# Patient Record
Sex: FEMALE | Race: WHITE
[De-identification: names, ages, dates, MRNs, and addresses within clinical notes are randomized per-mention and may not be internally consistent; named-entity substitution may affect disease eponyms.]

---

## 2017-04-13 ENCOUNTER — HOSPITAL ENCOUNTER (INPATIENT)
Dept: HOSPITAL 17 - NEPE | Age: 57
LOS: 1 days | Discharge: HOME | DRG: 378 | End: 2017-04-14
Attending: HOSPITALIST | Admitting: HOSPITALIST
Payer: MEDICAID

## 2017-04-13 VITALS
TEMPERATURE: 98.4 F | DIASTOLIC BLOOD PRESSURE: 81 MMHG | RESPIRATION RATE: 16 BRPM | SYSTOLIC BLOOD PRESSURE: 165 MMHG | OXYGEN SATURATION: 98 % | HEART RATE: 99 BPM

## 2017-04-13 VITALS
HEART RATE: 99 BPM | DIASTOLIC BLOOD PRESSURE: 109 MMHG | RESPIRATION RATE: 16 BRPM | SYSTOLIC BLOOD PRESSURE: 185 MMHG | OXYGEN SATURATION: 98 %

## 2017-04-13 VITALS — OXYGEN SATURATION: 97 % | HEART RATE: 94 BPM | RESPIRATION RATE: 14 BRPM

## 2017-04-13 VITALS — BODY MASS INDEX: 24.02 KG/M2 | WEIGHT: 144.18 LBS | HEIGHT: 65 IN

## 2017-04-13 VITALS — TEMPERATURE: 97.9 F | DIASTOLIC BLOOD PRESSURE: 97 MMHG | SYSTOLIC BLOOD PRESSURE: 178 MMHG

## 2017-04-13 VITALS — SYSTOLIC BLOOD PRESSURE: 175 MMHG | DIASTOLIC BLOOD PRESSURE: 90 MMHG

## 2017-04-13 VITALS
DIASTOLIC BLOOD PRESSURE: 74 MMHG | HEART RATE: 99 BPM | TEMPERATURE: 98.4 F | SYSTOLIC BLOOD PRESSURE: 139 MMHG | RESPIRATION RATE: 20 BRPM | OXYGEN SATURATION: 98 %

## 2017-04-13 VITALS
SYSTOLIC BLOOD PRESSURE: 175 MMHG | RESPIRATION RATE: 14 BRPM | HEART RATE: 99 BPM | DIASTOLIC BLOOD PRESSURE: 105 MMHG | OXYGEN SATURATION: 99 %

## 2017-04-13 VITALS
TEMPERATURE: 98.1 F | OXYGEN SATURATION: 97 % | DIASTOLIC BLOOD PRESSURE: 63 MMHG | HEART RATE: 88 BPM | SYSTOLIC BLOOD PRESSURE: 158 MMHG | RESPIRATION RATE: 16 BRPM

## 2017-04-13 VITALS
HEART RATE: 98 BPM | OXYGEN SATURATION: 96 % | DIASTOLIC BLOOD PRESSURE: 103 MMHG | RESPIRATION RATE: 18 BRPM | SYSTOLIC BLOOD PRESSURE: 199 MMHG

## 2017-04-13 VITALS — OXYGEN SATURATION: 99 %

## 2017-04-13 DIAGNOSIS — Z88.1: ICD-10-CM

## 2017-04-13 DIAGNOSIS — Z72.0: ICD-10-CM

## 2017-04-13 DIAGNOSIS — M19.90: ICD-10-CM

## 2017-04-13 DIAGNOSIS — J44.9: ICD-10-CM

## 2017-04-13 DIAGNOSIS — F41.9: ICD-10-CM

## 2017-04-13 DIAGNOSIS — K92.0: Primary | ICD-10-CM

## 2017-04-13 DIAGNOSIS — D72.829: ICD-10-CM

## 2017-04-13 DIAGNOSIS — I25.10: ICD-10-CM

## 2017-04-13 DIAGNOSIS — E87.1: ICD-10-CM

## 2017-04-13 DIAGNOSIS — R73.01: ICD-10-CM

## 2017-04-13 DIAGNOSIS — Z88.8: ICD-10-CM

## 2017-04-13 DIAGNOSIS — Z88.5: ICD-10-CM

## 2017-04-13 DIAGNOSIS — B18.2: ICD-10-CM

## 2017-04-13 DIAGNOSIS — K21.9: ICD-10-CM

## 2017-04-13 DIAGNOSIS — F10.10: ICD-10-CM

## 2017-04-13 DIAGNOSIS — I10: ICD-10-CM

## 2017-04-13 DIAGNOSIS — Z88.0: ICD-10-CM

## 2017-04-13 DIAGNOSIS — F31.9: ICD-10-CM

## 2017-04-13 LAB
ALP SERPL-CCNC: 94 U/L (ref 45–117)
ALT SERPL-CCNC: 36 U/L (ref 10–53)
ANION GAP SERPL CALC-SCNC: 13 MEQ/L (ref 5–15)
AST SERPL-CCNC: 58 U/L (ref 15–37)
BASOPHILS # BLD AUTO: 0 TH/MM3 (ref 0–0.2)
BASOPHILS NFR BLD: 0.1 % (ref 0–2)
BILIRUB SERPL-MCNC: 0.6 MG/DL (ref 0.2–1)
BUN SERPL-MCNC: 12 MG/DL (ref 7–18)
CHLORIDE SERPL-SCNC: 85 MEQ/L (ref 98–107)
EOSINOPHIL # BLD: 0 TH/MM3 (ref 0–0.4)
EOSINOPHIL NFR BLD: 0 % (ref 0–4)
ERYTHROCYTE [DISTWIDTH] IN BLOOD BY AUTOMATED COUNT: 13.2 % (ref 11.6–17.2)
GFR SERPLBLD BASED ON 1.73 SQ M-ARVRAT: 67 ML/MIN (ref 89–?)
HCO3 BLD-SCNC: 30.7 MEQ/L (ref 21–32)
HCT VFR BLD CALC: 37.9 % (ref 35–46)
HCT VFR BLD CALC: 41.2 % (ref 35–46)
HEMO FLAGS: (no result)
INR PPP: 1.2 RATIO
LYMPHOCYTES # BLD AUTO: 0.6 TH/MM3 (ref 1–4.8)
LYMPHOCYTES NFR BLD AUTO: 5.4 % (ref 9–44)
MCH RBC QN AUTO: 32.4 PG (ref 27–34)
MCHC RBC AUTO-ENTMCNC: 35.5 % (ref 32–36)
MCV RBC AUTO: 91.3 FL (ref 80–100)
MONOCYTES NFR BLD: 7.2 % (ref 0–8)
NEUTROPHILS # BLD AUTO: 10.3 TH/MM3 (ref 1.8–7.7)
NEUTROPHILS NFR BLD AUTO: 87.3 % (ref 16–70)
PLATELET # BLD: 372 TH/MM3 (ref 150–450)
POTASSIUM SERPL-SCNC: 4.3 MEQ/L (ref 3.5–5.1)
PROTHROMBIN TIME: 13.2 SEC (ref 9.8–11.6)
RBC # BLD AUTO: 4.51 MIL/MM3 (ref 4–5.3)
REVIEW FLAG: (no result)
SODIUM SERPL-SCNC: 129 MEQ/L (ref 136–145)
WBC # BLD AUTO: 11.8 TH/MM3 (ref 4–11)

## 2017-04-13 PROCEDURE — 85025 COMPLETE CBC W/AUTO DIFF WBC: CPT

## 2017-04-13 PROCEDURE — 96375 TX/PRO/DX INJ NEW DRUG ADDON: CPT

## 2017-04-13 PROCEDURE — 86850 RBC ANTIBODY SCREEN: CPT

## 2017-04-13 PROCEDURE — 86901 BLOOD TYPING SEROLOGIC RH(D): CPT

## 2017-04-13 PROCEDURE — 85018 HEMOGLOBIN: CPT

## 2017-04-13 PROCEDURE — 0DB38ZX EXCISION OF LOWER ESOPHAGUS, VIA NATURAL OR ARTIFICIAL OPENING ENDOSCOPIC, DIAGNOSTIC: ICD-10-PCS | Performed by: INTERNAL MEDICINE

## 2017-04-13 PROCEDURE — 83036 HEMOGLOBIN GLYCOSYLATED A1C: CPT

## 2017-04-13 PROCEDURE — 80307 DRUG TEST PRSMV CHEM ANLYZR: CPT

## 2017-04-13 PROCEDURE — 88305 TISSUE EXAM BY PATHOLOGIST: CPT

## 2017-04-13 PROCEDURE — 88312 SPECIAL STAINS GROUP 1: CPT

## 2017-04-13 PROCEDURE — 85610 PROTHROMBIN TIME: CPT

## 2017-04-13 PROCEDURE — 96366 THER/PROPH/DIAG IV INF ADDON: CPT

## 2017-04-13 PROCEDURE — 80053 COMPREHEN METABOLIC PANEL: CPT

## 2017-04-13 PROCEDURE — 96365 THER/PROPH/DIAG IV INF INIT: CPT

## 2017-04-13 PROCEDURE — C9113 INJ PANTOPRAZOLE SODIUM, VIA: HCPCS

## 2017-04-13 PROCEDURE — 85014 HEMATOCRIT: CPT

## 2017-04-13 PROCEDURE — 76937 US GUIDE VASCULAR ACCESS: CPT

## 2017-04-13 PROCEDURE — 86900 BLOOD TYPING SEROLOGIC ABO: CPT

## 2017-04-13 RX ADMIN — PANTOPRAZOLE SODIUM SCH MLS/HR: 40 INJECTION, POWDER, FOR SOLUTION INTRAVENOUS at 08:15

## 2017-04-13 RX ADMIN — ONDANSETRON PRN MG: 2 INJECTION, SOLUTION INTRAMUSCULAR; INTRAVENOUS at 20:25

## 2017-04-13 RX ADMIN — Medication SCH ML: at 21:00

## 2017-04-13 RX ADMIN — PHENYTOIN SODIUM SCH MLS/HR: 50 INJECTION INTRAMUSCULAR; INTRAVENOUS at 12:00

## 2017-04-13 NOTE — GIPROC
[facility]

[address] [facility]. [facility], [address]

 

 

EGD PROCEDURE REPORT     EXAM DATE: 04/13/2017

 

PATIENT NAME:      Amelia Gill           MR #:      [id number]

YOB: 1960      VISIT #:     [id number]

ATTENDING:     Shen Beckford MD     ORDER #:     [id number]

ASSISTANT:      Shiva Gil Wilcox-Hassen, Alice, and Alice Garcia

STATUS:     inpatient

 

INDICATIONS:  The patient is a 56 yr old female here for an EGD due to

hematemesis

PROCEDURE PERFORMED:     EGD w/ biopsy

MEDICATIONS:     Per Anesthesia and None.

TOPICAL ANESTHETIC:

 

CONSENT: The patient understands the risks and benefits of the procedure and

understands that these risks include, but are not limited to: sedation,

allergic reaction, infection, perforation and/or bleeding. Alternative means of

evaluation and treatment include, among others: physical exam, x-rays, and/or

surgical intervention. The patient elects to proceed with this endoscopic

procedure.

 



medical equipment was checked for proper function. Hand hygiene and appropriate

measures for infection prevention was taken. After the risks, benefits and

alternatives of the procedure were thoroughly explained, Informed consent was

verified, confirmed and timeout was successfully executed by the treatment

team. The patient was anesthetized with topical anesthesia and the Pentax

[id number] endoscope was introduced through the mouth and advanced to the second

portion of the duodenum.  Retroflexed views revealed old blood  The gastroscope

was then slowly withdrawn and removed.

Gastropathy in the body.   Esophagitis Bx from distal esophagus.

 

 

 

ADVERSE EVENTS:     There were no complications.

IMPRESSIONS:     1.  Gastropathy in the body

2.  Esophagitis Bx from distal esophagus

3.  Retroflexed views revealed old blood

 

RECOMMENDATIONS:     1.  Await biopsy results.  Biopsy results will not be ready

for 7-10 days.  If you don't hear from us in two weeks, call our office for

biopsy results.

2.  Anti-reflux regimen

3.  Continue PPI

4.  Avoid NSAIDS

5.  No ETOH

PATIENT CONDITION:     stable

DISPOSITION:     Inpatient

REPEAT EXAM:     Return as needed for EGD

 

 

___________________________________

Shen Beckford MD

eSigned:  Shen Beckford MD 04/13/2017 2:52 PM

 

 

cc:

## 2017-04-13 NOTE — PD
HPI


Chief Complaint:  Abdominal Pain


Time Seen by Provider:  07:40


Travel History


International Travel<30 days:  No


Contact w/Intl Traveler<30days:  No


Traveled to known affect area:  No





History of Present Illness


HPI


56-year-old female came to the emergency room with history of vomiting and 

abdominal pain since yesterday.  She was brought by EMS.  She has vomited 

multiple times and is holding a vomiting bag that is filled with bloody to 

coffee-ground emesis.  Patient says that she goes on binge drinking every 

couple months.  She's been drinking a lot for past couple days.  She points her 

pain to the epigastric area.  Vital signs were stable.





[facility]


Past Medical History


*** Narrative Medical


List of her past medical, surgical, social or family history was reviewed from 

the nursing note.


Anemia:  Yes


Arthritis:  Yes


Asthma:  No


Autoimmune Disease:  No


Blood Disorders:  No


Bipolar Disorder:  Yes


Anxiety:  Yes


Depression:  Yes


Heart Rhythm Problems:  No


Cancer:  No


Cardiovascular Problems:  Yes


High Cholesterol:  No


Chest Pain:  No


Congestive Heart Failure:  No


COPD:  Yes


Cerebrovascular Accident:  No


Diabetes:  No


Diminished Hearing:  No


Endocrine:  No


GERD:  Yes


Glaucoma:  No


Genitourinary:  No


Headaches:  Yes


Hepatitis:  Yes


Hiatal Hernia:  No


Hypertension:  Yes


Kidney Stones:  No


Musculoskeletal:  No


Neurologic:  No


Psychiatric:  Yes (MANIC DEPRESSIVE DISORDER, BIPOLAR)


Reproductive:  No


Respiratory:  Yes (COPD)


Immunizations Current:  No


Myocardial Infarction:  No


Pancreatitis:  Yes


Pneumonia:  Yes


Renal Failure:  No


Seizures:  Yes


Sickle Cell Disease:  No


Sleep Apnea:  No


Thyroid Disease:  No


Ulcer:  No


Tetanus Vaccination:  < 5 Years


Influenza Vaccination:  No


Pregnant?:  Not Pregnant


Menopausal:  Yes





Past Surgical History


Abdominal Surgery:  Yes


AICD:  No


Arteriovenous Shunt:  No


Cardiac Surgery:  No


Ear Surgery:  No


Endocrine Surgery:  No


Eye Surgery:  No


Genitourinary Surgery:  No


Gynecologic Surgery:  Yes (HYSTERECTOMY 2000)


Hysterectomy:  Yes


Insulin Pump:  No


Joint Replacement:  No


Neurologic Surgery:  No


Oral Surgery:  No


Pacemaker:  No


Thoracic Surgery:  No


Tonsillectomy:  Yes


Other Surgery:  Yes (BACK, HAND)





Social History


Alcohol Use:  Yes (ALOT PER PT)


Tobacco Use:  Yes


Substance Use:  No





Allergies-Medications


(Allergen,Severity, Reaction):  


Coded Allergies:  


     Cleocin (Verified  Allergy, Severe, UNKNOWN REACTION, 1/9/16)


     Levaquin (Verified  Allergy, Severe, UNKNOWN REACTION, 1/9/16)


     Penicillin (Verified  Allergy, Severe, BLISTERS, 1/9/16)


     Vistaril (Verified  Allergy, Severe, "HEAD SPINS", 1/9/16)


     Codeine (Verified  Adverse Reaction, Severe, "EYE BLIND", 1/9/16)


 DIPLOPIA AND DIZZINESS


Comments


No known drug allergies.


Reported Meds & Prescriptions





Reported Meds & Active Scripts


Active


Reported


Lisinopril 20 Mg Tab 20 Mg PO DAILY





Narrative Medication


List of her home medications reviewed from the nursing note.





Review of Systems


Except as stated in HPI:  all other systems reviewed are Neg





Physical Exam


Narrative


GENERAL: Awake, alert, anxious


SKIN: Focused skin assessment warm/dry.


HEAD: Atraumatic. Normocephalic. 


EYES: Pupils equal and round. No scleral icterus. No injection or drainage.  

Conjunctival injection.


ENT: No nasal bleeding or discharge.  Mucous membranes pink and moist.


NECK: Trachea midline. No JVD. 


CARDIOVASCULAR: Regular rate and rhythm.  No murmur appreciated.


RESPIRATORY: No accessory muscle use. Clear to auscultation. Breath sounds 

equal bilaterally. 


GASTROINTESTINAL: Abdomen soft, non-tender, nondistended. Hepatic and splenic 

margins not palpable. 


MUSCULOSKELETAL: No obvious deformities. No clubbing.  No cyanosis.  No edema. 


NEUROLOGICAL: Awake and alert. No obvious cranial nerve deficits.  Motor 

grossly within normal limits. Normal speech.


PSYCHIATRIC: Appropriate mood and affect; insight and judgment normal.





Data


Data


Last Documented VS





Orders





 Complete Blood Count With Diff (4/13/17 07:40)


Comprehensive Metabolic Panel (4/13/17 07:40)


Prothrombin Time / Inr (Pt) (4/13/17 07:40)


Alcohol (Ethanol) (4/13/17 07:40)


Type And Screen (4/13/17 07:40)


Ecg Monitoring (4/13/17 07:40)


Iv Access Insert/Monitor (4/13/17 07:40)


Oximetry (4/13/17 07:40)


Ondansetron Inj (Zofran Inj) (4/13/17 07:45)


Sodium Chlor 0.9% 1000 Ml Inj (Ns 1000 M (4/13/17 07:40)


Sodium Chloride 0.9% Flush (Ns Flush) (4/13/17 07:45)


Pantoprazole Inj (Protonix Inj) (4/13/17 07:45)


Pantoprazole Inj (Protonix Inj) (4/13/17 07:45)


NPO (4/13/17 07:51)


Morphine Inj (Morphine Inj) (4/13/17 08:45)


Sodium Chlor 0.9% 1000 Ml Inj (Ns 1000 M (4/13/17 09:00)


Admit Order (Ed Use Only) (4/13/17 10:37)


Consult Gastroenterology (4/13/17 )





Labs








MDM


Medical Decision Making


Medical Screen Exam Complete:  Yes


Emergency Medical Condition:  Yes


Medical Record Reviewed:  Yes


Differential Diagnosis


Alcoholic gastritis, variceal bleed


Narrative Course


8:57 AM blood test results came back and they're within acceptable limits 

except for chemistry and sodium which is low.  Patient is getting second liter 

of IV fluid bolus.  I ordered Protonix bolus and drip.  I spoke with GI Dr. Ferguson and he wanted the patient to be nothing by mouth for upper endoscopy.  

Awaiting for the hospitalist to call back for admission.


Critical Care Narrative


Aggregate critical care time was 30 minutes. Time to perform other separately 

billable procedures was not  


included in the critical care time. My time did not include minutes spent 

treating any other patients simultaneously or on  


activities that did not directly contribute to the patient's treatment.  





The services I provided to this patient were to treat and/or prevent clinically 

significant deterioration that could result  


in: GI bleed, Protonix bolus and drip





I provided critical care services requiring my management, as noted below:


Chart data review, documentation time, medication orders and management, vital 

sign assessments/reviewing monitor data,  


ordering and reviewing lab tests, ordering and interpreting/reviewing x-rays 

and diagnostic studies, care of the patient  


and discussion of the patient with the admitting physicians.





Procedures


EKG Prior to Arrival:  No





HemaPrompt Point of Care


Internal Pos. & Neg. Controls:  Passed


Fecal Specimen Occult Blood:  Positive





Diagnosis





 Primary Impression:  


 GI bleed


 Qualified Code:  K92.2 - Gastrointestinal hemorrhage, unspecified 

gastrointestinal hemorrhage type


 Additional Impression:  


 Alcoholic





Admitting Information


Admitting Physician Requests:  Admit


Scripts


Thiamine (Vitamin B-1)100 Mg [id number] Mg PO DAILY  #30 TAB  Ref 3


   Prov:Robert Gallo MD         4/14/17 


Pantoprazole (Protonix)40 Mg Tab40 Mg PO DAILY  #30 TAB  Ref 3


   Prov:Robert Gallo MD         4/14/17








Fabian Chin MD Apr 13, 2017 07:45


 


Eosinophils # (Auto) 0.0 TH/MM3


 


Basophils # (Auto) 0.0 TH/MM3


 


CBC Comment DIFF FINAL 


 


Differential Comment  


 


Sodium Level 129 MEQ/L


 


Potassium Level 4.3 MEQ/L


 


Chloride Level 85 MEQ/L


 


Carbon Dioxide Level 30.7 MEQ/L


 


Anion Gap 13 MEQ/L


 


Blood Urea Nitrogen 12 MG/DL


 


Creatinine 0.87 MG/DL


 


Estimat Glomerular Filtration 67 ML/MIN





Rate 


 


Random Glucose 168 MG/DL


 


Calcium Level 9.8 MG/DL


 


Total Bilirubin 0.6 MG/DL


 


Aspartate Amino Transf 58 U/L





(AST/SGOT) 


 


Alanine Aminotransferase 36 U/L





(ALT/SGPT) 


 


Alkaline Phosphatase 94 U/L


 


Total Protein 9.2 GM/DL


 


Albumin 4.2 GM/DL


 


Ethyl Alcohol Level LESS THAN 3





 MG/DL


 


Blood Type B NEGATIVE 


 


Antibody Screen NEGATIVE 











MDM


Medical Decision Making


Medical Screen Exam Complete:  Yes


Emergency Medical Condition:  Yes


Medical Record Reviewed:  Yes


Differential Diagnosis


Alcoholic gastritis, variceal bleed


Narrative Course


8:57 AM blood test results came back and they're within acceptable limits 

except for chemistry and sodium which is low.  Patient is getting second liter 

of IV fluid bolus.  I ordered Protonix bolus and drip.  I spoke with GI Dr. Ferguson and he wanted the patient to be nothing by mouth for upper endoscopy.  

Awaiting for the hospitalist to call back for admission.


Critical Care Narrative


Aggregate critical care time was 30 minutes. Time to perform other separately 

billable procedures was not  


included in the critical care time. My time did not include minutes spent 

treating any other patients simultaneously or on  


activities that did not directly contribute to the patient's treatment.  





The services I provided to this patient were to treat and/or prevent clinically 

significant deterioration that could result  


in: GI bleed, Protonix bolus and drip





I provided critical care services requiring my management, as noted below:


Chart data review, documentation time, medication orders and management, vital 

sign assessments/reviewing monitor data,  


ordering and reviewing lab tests, ordering and interpreting/reviewing x-rays 

and diagnostic studies, care of the patient  


and discussion of the patient with the admitting physicians.





Procedures


EKG Prior to Arrival:  No





HemaPrompt Point of Care


Internal Pos. & Neg. Controls:  Passed


Fecal Specimen Occult Blood:  Positive





Diagnosis





 Primary Impression:  


 GI bleed


 Qualified Code:  K92.2 - Gastrointestinal hemorrhage, unspecified 

gastrointestinal hemorrhage type


 Additional Impression:  


 Alcoholic





Admitting Information


Admitting Physician Requests:  Admit








Fabian Chin MD Apr 13, 2017 07:45

## 2017-04-13 NOTE — HHI.HP
__________________________________________________





[facility]


Service


[facility]


Primary Care Physician


Sami Stephens M.D.


Admission Diagnosis


GI bleed


Diagnoses:  


(1) Gastrointestinal hemorrhage with hematemesis


(2) Acute upper GI bleeding


(3) IFG (impaired fasting glucose)


(4) Benign labile hypertension


(5) Hyponatremia


(6) Hepatitis C


(7) ETOH abuse


Chief Complaint:  


Coffee-ground vomiting and Abdominal pain


Travel History


International Travel<30 Days:  No


Contact w/Intl Traveler <30 Da:  No


Traveled to Known Affected Are:  No


History of Present Illness


56 year-old  female with a history of hep C, multiple admission for GI 

bleed was brought to the ED for evaluation of multiple episode of coffee-ground 

emesis since 6 PM yesterday 04/12/17, associated with abdominal pain rated 10/

10 in intensity without any hematuria, hemoptysis or lower GI bleed.  She 

complains of shortness of breath however denies any chest pain.  She states, she

's been drinking heavily.  H/H on admission stable.





Review of Systems


Other 12 systems reviewed and are negative except for the one mentioned in 

history of present illness





Past Family Social History


Past Medical History


Anemia:  Yes


Arthritis:  Yes


Bipolar Disorder:  Yes


Anxiety:  Yes


Depression:  Yes


Cardiovascular Problems:  Yes


COPD:  Yes


GERD:  Yes


Headaches:  Yes


Hepatitis:  Yes


Hypertension:  Yes


Psychiatric:  Yes (MANIC DEPRESSIVE DISORDER, BIPOLAR)


Respiratory:  Yes (COPD)


Pancreatitis:  Yes


Seizures:  Yes














Past Surgical History


Back surgery


Hand surgery


Tonsillectomy


Hysterectomy


Reported Medications


Librium 25 mg Cap (Chlordiazepoxide) 25 Mg Cap 25 Mg PO Q8 


Kcl 20 Meq Tab (Potassium Chloride) 20 Meq Tabcr 20 Meq PO DAILY  10 Days


Magnesium 500 Mg Tab 500 Mg PO DAILY  7 Days


Vitamin B12 (Cyanocobalamin) 1,000 Mcg Tab 1,000 Mcg PO DAILY 


Protonix (Pantoprazole Sodium) 40 Mg Tab 40 Mg PO BID 


Reported


Lisinopril 20 mg (Lisinopril) 20 Mg Tab 0.5 Tab PO DAILY 


Flexeril (Cyclobenzaprine HCl) 10 Mg Tab 10 Mg PO TID 


Seroquel (Quetiapine Fumarate) 100 Mg Tab 200 Mg PO HS


Allergies:  


Coded Allergies:  


     Cleocin (Verified  Allergy, Severe, UNKNOWN REACTION, 1/9/16)


     Levaquin (Verified  Allergy, Severe, UNKNOWN REACTION, 1/9/16)


     Penicillin (Verified  Allergy, Severe, BLISTERS, 1/9/16)


     Vistaril (Verified  Allergy, Severe, "HEAD SPINS", 1/9/16)


     Codeine (Verified  Adverse Reaction, Severe, "EYE BLIND", 1/9/16)


 DIPLOPIA AND DIZZINESS


Social History


Alcohol Use:  Yes (ALOT PER PT)


Tobacco Use:  Yes


Substance Use:  No





Physical Exam


Vital Signs





 Vital Signs








  Date Time  Temp Pulse Resp B/P Pulse Ox O2 Delivery O2 Flow Rate FiO2


 


4/13/17 08:31  99 16 185/109 98 Nasal Cannula 2 


 


4/13/17 07:45  94 14  97 Nasal Cannula 2 


 


4/13/17 07:40   16     


 


4/13/17 07:36 98.4 99 16 165/81 98   








Physical Exam


GENERAL: This is a well-nourished, well-developed patient, in no apparent 

distress.


SKIN: No rashes, ecchymoses or lesions. Cool and dry.


HEAD: Atraumatic. Normocephalic. No temporal or scalp tenderness.


EYES: Pupils equal round and reactive. Extraocular motions intact. No scleral 

icterus. No injection or drainage. 


ENT: Nose without bleeding, purulent drainage or septal hematoma. Throat 

without erythema, tonsillar hypertrophy or exudate. Uvula midline. Airway 

patent.


NECK: Trachea midline. No JVD or lymphadenopathy. Supple, nontender, no 

meningeal signs.


CARDIOVASCULAR: Regular rate and rhythm without murmurs, gallops, or rubs. 


RESPIRATORY: Clear to auscultation. Breath sounds equal bilaterally. No wheezes

, rales, or rhonchi.  


GASTROINTESTINAL: Abdomen soft, non-tender, nondistended. No hepato-splenomegaly

, or palpable masses. No guarding.


MUSCULOSKELETAL: Extremities without clubbing, cyanosis, or edema. No joint 

tenderness, effusion, or edema noted. No calf tenderness. Negative Homans sign 

bilaterally.


NEUROLOGICAL: Awake and alert. Cranial nerves II through XII intact.  Motor and 

sensory grossly within normal limits. Five out of 5 muscle strength in all 

muscle groups.  Normal speech.


Laboratory





Laboratory Tests








Test 4/13/17





 08:00


 


White Blood Count 11.8 


 


Red Blood Count 4.51 


 


Hemoglobin 14.6 


 


Hematocrit 41.2 


 


Mean Corpuscular Volume 91.3 


 


Mean Corpuscular Hemoglobin 32.4 


 


Mean Corpuscular Hemoglobin 35.5 





Concent 


 


Red Cell Distribution Width 13.2 


 


Platelet Count 372 


 


Mean Platelet Volume 9.4 


 


Neutrophils (%) (Auto) 87.3 


 


Lymphocytes (%) (Auto) 5.4 


 


Monocytes (%) (Auto) 7.2 


 


Eosinophils (%) (Auto) 0.0 


 


Basophils (%) (Auto) 0.1 


 


Neutrophils # (Auto) 10.3 


 


Lymphocytes # (Auto) 0.6 


 


Monocytes # (Auto) 0.8 


 


Eosinophils # (Auto) 0.0 


 


Basophils # (Auto) 0.0 


 


CBC Comment DIFF FINAL 


 


Differential Comment  


 


Sodium Level 129 


 


Potassium Level 4.3 


 


Chloride Level 85 


 


Carbon Dioxide Level 30.7 


 


Anion Gap 13 


 


Blood Urea Nitrogen 12 


 


Creatinine 0.87 


 


Estimat Glomerular Filtration 67 





Rate 


 


Random Glucose 168 


 


Calcium Level 9.8 


 


Total Bilirubin 0.6 


 


Aspartate Amino Transf 58 





(AST/SGOT) 


 


Alanine Aminotransferase 36 





(ALT/SGPT) 


 


Alkaline Phosphatase 94 


 


Total Protein 9.2 


 


Albumin 4.2 


 


Ethyl Alcohol Level LESS THAN 3 


 


Blood Type B NEGATIVE 


 


Antibody Screen NEGATIVE 








Result Diagram:  


4/13/17 0800                                                                   

             4/13/17 0800








Assessment and Plan


Problem List:  


(1) Gastrointestinal hemorrhage with hematemesis


ICD Code:  K92.0


Status:  Acute


(2) Acute upper GI bleeding


ICD Code:  K92.2


Status:  Acute


(3) Hyponatremia


ICD Code:  E87.1


Status:  Acute


(4) IFG (impaired fasting glucose)


ICD Code:  R73.01


Status:  Acute


(5) Leukocytosis


ICD Code:  D72.829


Status:  Acute


(6) Benign labile hypertension


ICD Code:  I10


Status:  Acute


Assessment and Plan


56-year-old female with





Gastrointestinal hemorrhage with hematemesis 


Acute upper GI bleeding


-start Protonix drip, serial H&H monitoring and consult gastroenterology for 

evaluation for upper endoscopy.  Consider octreotide.  Keep nothing by mouth/

IVF hydration





Benign labile hypertension


-Give labetalol 20 mg IV 1 now, continue labetalol 10 mg every 4 when necessary





History of hepatitis C: Chronic; secondary to history of alcohol abuse will 

check liver ultrasound and rule out cirrhosis





Alcohol abuse: Alcohol cessation advised, start rally pack, CIWA protocol, 

seizure precautions and watch for DTs





Tobacco abuse: Tobacco counseling provided, start nicotine patch





Hyponatremia: 2/2 beer potomania; start IVF hydration and monitor electrolyte





Leukocytosis: Stress reactive, chest x-ray noted and reviewed by me without any 

cardio pulmonary disease.  Continue to monitor





IFG: No known history of diabetes type 2, check hemoglobin A1c and treat 

accordingly





DVT prophylaxis: Chemical anti-apractic contraindicated, bilateral SCDs


GI prophylaxis: PPI


Code Status


Full code


Discussed Condition With


Patient, ED physician





Physician Certification


2 Midnight Certification Type:  Admission for Inpatient Services


Order for Inpatient Services


The services are ordered in accordance with Medicare regulations or non-

Medicare payer requirements, as applicable.  In the case of services not 

specified as inpatient-only, they are appropriately provided as inpatient 

services in accordance with the 2-midnight benchmark.


Estimated LOS (days):  2


 days is the estimated time the patient will need to remain in the hospital, 

assuming treatment plan goals are met and no additional complications.


Post-Hospital Plan:  Not yet determined








Robert Gallo MD Apr 13, 2017 10:44

## 2017-04-14 VITALS
HEART RATE: 93 BPM | TEMPERATURE: 97.4 F | DIASTOLIC BLOOD PRESSURE: 71 MMHG | RESPIRATION RATE: 20 BRPM | SYSTOLIC BLOOD PRESSURE: 127 MMHG | OXYGEN SATURATION: 93 %

## 2017-04-14 VITALS
RESPIRATION RATE: 20 BRPM | HEART RATE: 97 BPM | SYSTOLIC BLOOD PRESSURE: 151 MMHG | OXYGEN SATURATION: 96 % | TEMPERATURE: 98.6 F | DIASTOLIC BLOOD PRESSURE: 79 MMHG

## 2017-04-14 VITALS
OXYGEN SATURATION: 93 % | DIASTOLIC BLOOD PRESSURE: 75 MMHG | RESPIRATION RATE: 20 BRPM | SYSTOLIC BLOOD PRESSURE: 133 MMHG | HEART RATE: 90 BPM | TEMPERATURE: 99.7 F

## 2017-04-14 LAB
BASOPHILS # BLD AUTO: 0 TH/MM3 (ref 0–0.2)
BASOPHILS NFR BLD: 0.1 % (ref 0–2)
EOSINOPHIL # BLD: 0 TH/MM3 (ref 0–0.4)
EOSINOPHIL NFR BLD: 0.1 % (ref 0–4)
ERYTHROCYTE [DISTWIDTH] IN BLOOD BY AUTOMATED COUNT: 12.9 % (ref 11.6–17.2)
HCT VFR BLD CALC: 32.6 % (ref 35–46)
HEMO FLAGS: (no result)
HEMOGLOBIN A1A: 0.7 %
HEMOGLOBIN A1B: 1.2 %
HEMOGLOBIN AO: 86.8 %
HEMOGLOBIN LA1C: 1.9 %
HEMOGLOBIN P3: 3.5 %
LYMPHOCYTES # BLD AUTO: 1.4 TH/MM3 (ref 1–4.8)
LYMPHOCYTES NFR BLD AUTO: 11.5 % (ref 9–44)
MCH RBC QN AUTO: 31.9 PG (ref 27–34)
MCHC RBC AUTO-ENTMCNC: 34.4 % (ref 32–36)
MCV RBC AUTO: 92.7 FL (ref 80–100)
MONOCYTES NFR BLD: 8.8 % (ref 0–8)
NEUTROPHILS # BLD AUTO: 9.9 TH/MM3 (ref 1.8–7.7)
NEUTROPHILS NFR BLD AUTO: 79.5 % (ref 16–70)
PLATELET # BLD: 173 TH/MM3 (ref 150–450)
RBC # BLD AUTO: 3.51 MIL/MM3 (ref 4–5.3)
WBC # BLD AUTO: 12.4 TH/MM3 (ref 4–11)

## 2017-04-14 RX ADMIN — Medication SCH ML: at 09:00

## 2017-04-14 RX ADMIN — ONDANSETRON PRN MG: 2 INJECTION, SOLUTION INTRAMUSCULAR; INTRAVENOUS at 11:29

## 2017-04-14 RX ADMIN — PHENYTOIN SODIUM SCH MLS/HR: 50 INJECTION INTRAMUSCULAR; INTRAVENOUS at 03:41

## 2017-04-14 RX ADMIN — PANTOPRAZOLE SODIUM SCH MLS/HR: 40 INJECTION, POWDER, FOR SOLUTION INTRAVENOUS at 03:40

## 2017-04-14 NOTE — MB
cc:

ASHISH BARNARD M.D.

****

 

 

DATE OF CONSULTATION

04/13/2017

 

DATE OF BIRTH

1960

 

REFERRING PHYSICIAN

Dr. Altamirano.

 

HISTORY OF PRESENT ILLNESS

A pleasant 56-year-old lady who came because of coffee-ground emesis since

yesterday at 6 p.m. with abdominal pain.  She stated the pain was 10/10.  She

denied hematemesis.  No hematoma, no  hematemesis, no hematochezia and she had

some shortness of breath but currently doing well, without chest pain.  She has

known history of alcohol abuse.  She binge drinks from time to time and she

just has been drinking heavily in the last 48-72 hours.  No other complaint.

 

PAST MEDICAL HISTORY

1. Anxiety, depression.

2. Bipolar disorder.

3. Coronary artery disease.

4. Reflux symptoms.

5. COPD.

6. Headache.

7. Hepatitis C.

8. Hypertension.

9. Manic depressive disorder.

10. History of pancreatitis.

11. History of seizure.

12. Arthritis.

 

MEDICATIONS

Reviewed in the chart.

 

ALLERGIES

LEVAQUIN.

PENICILLIN.

CODEINE.

VISTARIL.

CLEOCIN.

 

SOCIAL HISTORY

Significant for tobacco and alcohol.  No drugs.

 

SURGERIES

1. Hand surgery.

2. Tonsillectomy.

3. Hysterectomy.

4. Back surgery

 

REVIEW OF SYSTEMS

All 12-points negative except HPI.

 

PHYSICAL EXAMINATION

GENERAL:  Alert, oriented, in no acute distress at this time.  VITAL SIGNS:

Stable.

HEENT:  Pupils round, reactive to light.

NECK:  Supple.

CHEST:  Clear to auscultation and precaution at this time.  No wheezing.

ABDOMEN:  Soft, nondistended, nontender, positive bowel sounds.  No

hepatosplenomegaly.  No masses.

MUSCULOSKELETAL:  Normal.

NEUROLOGIC:  Normal.

PSYCHOLOGIC:  Appropriate at this time.

 

LABORATORY DATA

White count 11.8, hemoglobin 14.6, platelets 372.

INR 1.2.

AST 58, ALT 36, total bilirubin 0.6.

BUN 12, creatinine 0.87.

Alcohol level was less than 3.

 

ASSESSMENT AND PLAN

1. A 56-year-old lady with alcohol binge.  She has some coffee-ground emesis

   after drinking heavily.  The patient may have varices, may have

   gastroparesis, esophagitis, peptic ulcer disease.  I am planning on doing

   urgent upper endoscopy.  I discussed with her the procedure and

   complications.  She agreed to have it done.  This will be done today.

2. Elevated liver function tests, AST more than ALT, most likely alcohol

   driven.  I advised the patient to be abstinent of alcohol completely and we

   need to watch for DTs.

3. Further plan to follow based on the finding on the endoscopy and how she is

   doing.

                              _________________________________

                              MD DELLA Chang/MRAGRET       D:  4/13/2017/8:19 PM    T:  4/14/2017/6:15 AM

Visit #:  [id number]      Job #:  [id number]

## 2017-04-14 NOTE — HHI.PR
Subjective


Remarks


Follow up upper GI bleeding/Hematemesis


04/14/17-patient seen and examined; s/p EGD; no Upper GI bleeding over the past 

12 hours





Objective


Vitals





 Vital Signs








  Date Time  Temp Pulse Resp B/P Pulse Ox O2 Delivery O2 Flow Rate FiO2


 


4/14/17 08:00 98.6 97 20 151/79 96   


 


4/14/17 04:00 97.4 93 20 127/71 93   


 


4/14/17 00:00 99.7 90 20 133/75 93   


 


4/13/17 20:32     99   21


 


4/13/17 20:00 98.4 99 20 139/74 98   


 


4/13/17 15:20  92 16 115/68 99 Room Air  


 


4/13/17 15:00 97.4 90 16 118/66 99 Nasal Cannula 2 


 


4/13/17 13:30 98.1 88 16 158/63 97   


 


4/13/17 12:52  90 14 175/90 97   


 


4/13/17 12:46 97.9 92 14 178/97 97   


 


4/13/17 11:18  99 14 175/105 99 Room Air  


 


4/13/17 10:40  98 18 199/103 96 Room Air  








 I/O








 4/13/17 4/13/17 4/13/17 4/14/17 4/14/17 4/14/17





 07:00 15:00 23:00 07:00 15:00 23:00


 


Intake Total  300 ml 0 ml   


 


Output Total  50 ml 0 ml   


 


Balance  250 ml 0 ml   


 


      


 


Intake Oral   0 ml   


 


Other  300 ml 0 ml   


 


Output Urine Total  0 ml 0 ml   


 


Emesis  50 ml    


 


Estimated Blood Loss  0 ml    


 


# Voids   0   


 


# Bowel Movements   0   








Result Diagram:  


4/14/17 0755                                                                   

             4/13/17 0800





Objective Remarks


GENERAL: NAD


SKIN: Warm and dry.


HEAD: Normocephalic.


EYES: No scleral icterus. No injection or drainage. 


NECK: Supple, trachea midline. No JVD or lymphadenopathy.


CARDIOVASCULAR: Regular rate and rhythm without murmurs, gallops, or rubs. 


RESPIRATORY: Breath sounds equal bilaterally. No accessory muscle use.


GASTROINTESTINAL: Abdomen soft, non-tender, nondistended. 


MUSCULOSKELETAL: No cyanosis, or edema. 


BACK: Nontender without obvious deformity. No CVA tenderness.





Procedures


EGD 4/13/17





A/P


Problem List:  


(1) Gastrointestinal hemorrhage with hematemesis


ICD Code:  K92.0


Status:  Resolved


(2) Acute upper GI bleeding


ICD Code:  K92.2


Status:  Resolved


(3) IFG (impaired fasting glucose)


ICD Code:  R73.01


Status:  Acute


(4) Benign labile hypertension


ICD Code:  I10


Status:  Chronic


(5) Hyponatremia


ICD Code:  E87.1


Status:  Acute


(6) Hepatitis C


ICD Code:  B19.20


Status:  Chronic


(7) ETOH abuse


ICD Code:  F10.10


Status:  Chronic


Assessment and Plan


56-year-old female with





Gastrointestinal hemorrhage with hematemesis 


Acute upper GI bleeding


-s/p EGD 4/13/17 pending pathology


-Appreciate input from GI


- will Protonix drip and discharge home on PPI, 


-NO Alcohol, NSAID





Benign labile hypertension-Resolved


- continue labetalol 10 mg every 4 when necessary





History of hepatitis C: Chronic; secondary to history of alcohol abuse will 

check liver ultrasound and rule out cirrhosis





Alcohol abuse: Alcohol cessation advised, on rally pack, CIWA protocol, seizure 

precautions and watch for DTs





Tobacco abuse: Tobacco counseling provided, on nicotine patch





Hyponatremia: 2/2 beer potomania; continue IVF hydration and monitor electrolyte





Leukocytosis: Stress reactive, chest x-ray without any cardio pulmonary 

disease.  Continue to monitor





IFG: No known history of diabetes type 2,  hemoglobin A1c pending and treat 

accordingly





DVT prophylaxis: Chemical anti-apractic contraindicated, bilateral SCDs


GI prophylaxis: PPI





patient's condition improved since admission therefore she will be discharged 

home


Discharge Planning


Discharge patient to home


Condition on discharge: Improved


Healthy Diet as tolerated


Ad Holly activity


Rx written:Protonix 40mg daily


Follow-up with primary care physician in 1 week


GI in 2-3 weeks








Robert Gallo MD Apr 14, 2017 10:06

## 2017-08-19 ENCOUNTER — HOSPITAL ENCOUNTER (EMERGENCY)
Dept: HOSPITAL 17 - NEPD | Age: 57
Discharge: HOME | End: 2017-08-19
Payer: COMMERCIAL

## 2017-08-19 VITALS
SYSTOLIC BLOOD PRESSURE: 179 MMHG | TEMPERATURE: 98 F | RESPIRATION RATE: 16 BRPM | HEART RATE: 80 BPM | OXYGEN SATURATION: 99 % | DIASTOLIC BLOOD PRESSURE: 84 MMHG

## 2017-08-19 VITALS — BODY MASS INDEX: 25.71 KG/M2 | HEIGHT: 65 IN | WEIGHT: 154.32 LBS
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
## 2017-08-19 DIAGNOSIS — T14.8: Primary | ICD-10-CM

## 2017-08-19 DIAGNOSIS — Z72.0: ICD-10-CM

## 2017-08-19 DIAGNOSIS — I10: ICD-10-CM

## 2017-08-19 PROCEDURE — 96372 THER/PROPH/DIAG INJ SC/IM: CPT

## 2017-08-19 PROCEDURE — 99284 EMERGENCY DEPT VISIT MOD MDM: CPT

## 2017-08-19 NOTE — PD
HPI


.


left sided muscle pain


Chief Complaint:  Back/ Neck Pain or Injury


Time Seen by Provider:  08:45


Travel History


International Travel<30 days:  No


Contact w/Intl Traveler<30days:  No


Traveled to known affect area:  No





History of Present Illness


HPI


57-year-old female here with complaints of left-sided muscle pain.  Patient 

tells me that she was sick about a month ago and that she developed some left-

sided muscle pain.  She has seen her primary care provider and was given muscle 

relaxers yesterday, and has only taken 1 dose.  She did take one this morning, 

but tells me that she is still having significant pain on the left paraspinal 

musculature.  She does not want any pain meds because she has a history of 

being on morphine in the past and tells me that she did not want to get 

addicted.  She does have an appointment with the pain management specialist as 

she is in search of injections to her back or getting back on pain meds.  She 

denies any bowel or bladder dysfunction.  She denies any saddle anesthesia.  

She denies any recent injury.  She does admit to cleaning her house a lot and 

this seems to aggravate her pain.





PFSH


Past Medical History


Anemia:  Yes


Arthritis:  Yes


Asthma:  No


Autoimmune Disease:  No


Blood Disorders:  No


Bipolar Disorder:  Yes


Anxiety:  Yes


Depression:  Yes


Heart Rhythm Problems:  No


Cancer:  No


Cardiovascular Problems:  Yes


High Cholesterol:  No


Chest Pain:  No


Congestive Heart Failure:  No


COPD:  Yes


Cerebrovascular Accident:  No


Diabetes:  No


Diminished Hearing:  No


Endocrine:  No


GERD:  Yes


Glaucoma:  No


Genitourinary:  No


Headaches:  Yes


Hepatitis:  Yes


Hiatal Hernia:  No


Hypertension:  Yes


Kidney Stones:  No


Musculoskeletal:  No


Neurologic:  No


Psychiatric:  Yes (MANIC DEPRESSIVE DISORDER, BIPOLAR)


Reproductive:  No


Respiratory:  Yes


Immunizations Current:  No


Myocardial Infarction:  No


Pancreatitis:  Yes


Pneumonia:  Yes


Renal Failure:  No


Seizures:  Yes


Sickle Cell Disease:  No


Sleep Apnea:  No


Thyroid Disease:  No


Ulcer:  No


Pregnant?:  Not Pregnant


Menopausal:  Yes





Past Surgical History


Abdominal Surgery:  Yes


AICD:  No


Arteriovenous Shunt:  No


Cardiac Surgery:  No


Ear Surgery:  No


Endocrine Surgery:  No


Eye Surgery:  No


Genitourinary Surgery:  No


Gynecologic Surgery:  Yes (HYSTERECTOMY 2000)


Hysterectomy:  Yes


Insulin Pump:  No


Joint Replacement:  No


Neurologic Surgery:  No


Oral Surgery:  No


Pacemaker:  No


Thoracic Surgery:  No


Tonsillectomy:  Yes


Other Surgery:  Yes (BACK, HAND)





Social History


Alcohol Use:  Yes (ALOT PER PT)


Tobacco Use:  Yes


Substance Use:  No





Allergies-Medications


(Allergen,Severity, Reaction):  


Coded Allergies:  


     clindamycin (Unverified  Allergy, Severe, UNKNOWN REACTION, 8/15/17)


     hydroxyzine (Unverified  Allergy, Severe, "HEAD SPINS", 8/15/17)


     levofloxacin (Unverified  Allergy, Severe, UNKNOWN REACTION, 8/15/17)


     penicillin G (Unverified  Allergy, Severe, BLISTERS, 8/15/17)


     codeine (Unverified  Adverse Reaction, Severe, "EYE BLIND", 8/15/17)


 DIPLOPIA AND DIZZINESS


Reported Meds & Prescriptions





Reported Meds & Active Scripts


Active


Vitamin B-1 (Thiamine HCl) 100 Mg Tab 100 Mg PO DAILY


Protonix (Pantoprazole Sodium) 40 Mg Tab 40 Mg PO DAILY


Reported


Lisinopril 20 Mg Tab 20 Mg PO DAILY








Review of Systems


General / Constitutional:  No: Fever


Eyes:  No: Visual changes


HENT:  No: Headaches


Cardiovascular:  No: Chest Pain or Discomfort


Respiratory:  No: Shortness of Breath


Gastrointestinal:  No: Abdominal Pain


Genitourinary:  No: Urgency, Dysuria, Nocturia, Hematuria, Decreased Urinary 

Output, Oliguria


Musculoskeletal:  Positive: Myalgias, Pain (back pain)


Skin:  No Rash


Neurologic:  No: Weakness


Psychiatric:  No: Depression


Endocrine:  No: Polydipsia


Hematologic/Lymphatic:  No: Easy Bruising





Physical Exam


Narrative


GENERAL: AAO x 3, no acute distress, Well-nourished, well-developed patient.


SKIN: Warm and dry. No visible rashes or bruising. 


HEAD: Normocephalic and atraumatic.


EYES: No scleral icterus. No injection or drainage. 


ENT: No nasal drainage noted. Mucous membranes pink. Airway patent. 


NECK: Supple, trachea midline. No JVD.


CARDIOVASCULAR: Regular rate and rhythm without murmurs, gallops, or rubs. 


RESPIRATORY: Breath sounds equal bilaterally. No accessory muscle use. No 

rhonchi or rales. 


GASTROINTESTINAL: Abdomen soft, non-tender, nondistended. no rebound or 

guarding 


EXTREMITIES: No cyanosis or edema. 


BACK: Nontender without obvious deformity. tenderness to left t-l paraspinal 

musculature 


NEURO: CN II-12 intact,  strength normal b/l, UE and LE 5/5, no focal 

deficits


PSYCH: AAO x 3, normal affect.





Data


Data


Last Documented VS





Vital Signs








  Date Time  Temp Pulse Resp B/P Pulse Ox O2 Delivery O2 Flow Rate FiO2


 


8/19/17 08:32 98.0 80 16 179/84 99   











MDM


Medical Decision Making


Medical Screen Exam Complete:  Yes


Emergency Medical Condition:  Yes


Medical Record Reviewed:  Yes


Differential Diagnosis


muscle strain, lumbar radiculopathy, less likely sciatica


Narrative Course


57-year-old female here with complaints of left-sided paraspinal muscular 

tenderness.


On examination she appears to have some pulled muscles.


She is already taking Flexeril this morning, therefore will provide her with a 

shot of Toradol.


I recommend that she follow up with the primary care provider and pain 

.


I do not believe imaging is indicated as this all seems to be muscle related.





I discussed this with patient.  She is in agreement with the recommended 

treatment plan.





Patient verbalized understanding of instructions, questions were answered, and 

thanked me for their care. I advised them if their condition worsens, please 

return to the nearest emergency room for further care.





Diagnosis





 Primary Impression:  


 Muscle strain


Patient Instructions:  General Instructions





***Additional Instructions:


Keep your appointment with the pain specialist.





Take your medications as prescribed.


***Med/Other Pt SpecificInfo:  No Change to Meds


Disposition:  01 DISCHARGE HOME


Condition:  Stable








Vinita Reynoso Aug 19, 2017 08:45

## 2017-08-27 ENCOUNTER — HOSPITAL ENCOUNTER (OUTPATIENT)
Dept: HOSPITAL 17 - NEPC | Age: 57
Setting detail: OBSERVATION
LOS: 1 days | Discharge: HOME | End: 2017-08-28
Attending: HOSPITALIST | Admitting: HOSPITALIST
Payer: COMMERCIAL

## 2017-08-27 VITALS
RESPIRATION RATE: 18 BRPM | OXYGEN SATURATION: 98 % | HEART RATE: 90 BPM | SYSTOLIC BLOOD PRESSURE: 130 MMHG | DIASTOLIC BLOOD PRESSURE: 62 MMHG

## 2017-08-27 VITALS
DIASTOLIC BLOOD PRESSURE: 59 MMHG | OXYGEN SATURATION: 96 % | RESPIRATION RATE: 20 BRPM | TEMPERATURE: 98.4 F | HEART RATE: 84 BPM | SYSTOLIC BLOOD PRESSURE: 125 MMHG

## 2017-08-27 VITALS
SYSTOLIC BLOOD PRESSURE: 148 MMHG | TEMPERATURE: 98.2 F | DIASTOLIC BLOOD PRESSURE: 60 MMHG | OXYGEN SATURATION: 96 % | HEART RATE: 80 BPM | RESPIRATION RATE: 18 BRPM

## 2017-08-27 VITALS
DIASTOLIC BLOOD PRESSURE: 68 MMHG | SYSTOLIC BLOOD PRESSURE: 139 MMHG | HEART RATE: 88 BPM | RESPIRATION RATE: 18 BRPM | OXYGEN SATURATION: 98 %

## 2017-08-27 VITALS
DIASTOLIC BLOOD PRESSURE: 79 MMHG | OXYGEN SATURATION: 96 % | HEART RATE: 79 BPM | RESPIRATION RATE: 20 BRPM | TEMPERATURE: 97 F | SYSTOLIC BLOOD PRESSURE: 150 MMHG

## 2017-08-27 VITALS — HEIGHT: 63 IN | WEIGHT: 143.3 LBS | BODY MASS INDEX: 25.39 KG/M2

## 2017-08-27 VITALS
DIASTOLIC BLOOD PRESSURE: 100 MMHG | SYSTOLIC BLOOD PRESSURE: 170 MMHG | TEMPERATURE: 98.3 F | OXYGEN SATURATION: 98 % | HEART RATE: 98 BPM | RESPIRATION RATE: 18 BRPM

## 2017-08-27 DIAGNOSIS — I10: ICD-10-CM

## 2017-08-27 DIAGNOSIS — I48.91: ICD-10-CM

## 2017-08-27 DIAGNOSIS — E87.6: ICD-10-CM

## 2017-08-27 DIAGNOSIS — F10.10: ICD-10-CM

## 2017-08-27 DIAGNOSIS — H54.7: Primary | ICD-10-CM

## 2017-08-27 DIAGNOSIS — E87.1: ICD-10-CM

## 2017-08-27 DIAGNOSIS — K29.20: ICD-10-CM

## 2017-08-27 DIAGNOSIS — E83.42: ICD-10-CM

## 2017-08-27 LAB
ALP SERPL-CCNC: 83 U/L (ref 45–117)
ALT SERPL-CCNC: 35 U/L (ref 10–53)
ANION GAP SERPL CALC-SCNC: 9 MEQ/L (ref 5–15)
AST SERPL-CCNC: 34 U/L (ref 15–37)
BASOPHILS # BLD AUTO: 0 TH/MM3 (ref 0–0.2)
BASOPHILS NFR BLD: 0.2 % (ref 0–2)
BILIRUB SERPL-MCNC: 0.3 MG/DL (ref 0.2–1)
BUN SERPL-MCNC: 14 MG/DL (ref 7–18)
CHLORIDE SERPL-SCNC: 83 MEQ/L (ref 98–107)
COLOR UR: (no result)
COMMENT (UR): (no result)
CULTURE IF INDICATED: (no result)
EOSINOPHIL # BLD: 0 TH/MM3 (ref 0–0.4)
EOSINOPHIL NFR BLD: 0.1 % (ref 0–4)
ERYTHROCYTE [DISTWIDTH] IN BLOOD BY AUTOMATED COUNT: 12.9 % (ref 11.6–17.2)
GFR SERPLBLD BASED ON 1.73 SQ M-ARVRAT: 56 ML/MIN (ref 89–?)
GLUCOSE UR STRIP-MCNC: 70 MG/DL
HCO3 BLD-SCNC: 28.1 MEQ/L (ref 21–32)
HCT VFR BLD CALC: 43.3 % (ref 35–46)
HEMO FLAGS: (no result)
HGB UR QL STRIP: (no result)
KETONES UR STRIP-MCNC: (no result) MG/DL
LYMPHOCYTES # BLD AUTO: 0.6 TH/MM3 (ref 1–4.8)
LYMPHOCYTES NFR BLD AUTO: 7.5 % (ref 9–44)
MCH RBC QN AUTO: 31.2 PG (ref 27–34)
MCHC RBC AUTO-ENTMCNC: 33.7 % (ref 32–36)
MCV RBC AUTO: 92.4 FL (ref 80–100)
MONOCYTES NFR BLD: 6 % (ref 0–8)
MUCOUS THREADS #/AREA URNS LPF: (no result) /LPF
NEUTROPHILS # BLD AUTO: 7 TH/MM3 (ref 1.8–7.7)
NEUTROPHILS NFR BLD AUTO: 86.2 % (ref 16–70)
NITRITE UR QL STRIP: (no result)
PLATELET # BLD: 253 TH/MM3 (ref 150–450)
POTASSIUM SERPL-SCNC: 4 MEQ/L (ref 3.5–5.1)
RBC # BLD AUTO: 4.69 MIL/MM3 (ref 4–5.3)
SODIUM SERPL-SCNC: 120 MEQ/L (ref 136–145)
SP GR UR STRIP: 1.01 (ref 1–1.03)
WBC # BLD AUTO: 8.1 TH/MM3 (ref 4–11)

## 2017-08-27 PROCEDURE — 80307 DRUG TEST PRSMV CHEM ANLYZR: CPT

## 2017-08-27 PROCEDURE — 99285 EMERGENCY DEPT VISIT HI MDM: CPT

## 2017-08-27 PROCEDURE — 96375 TX/PRO/DX INJ NEW DRUG ADDON: CPT

## 2017-08-27 PROCEDURE — 74177 CT ABD & PELVIS W/CONTRAST: CPT

## 2017-08-27 PROCEDURE — 83735 ASSAY OF MAGNESIUM: CPT

## 2017-08-27 PROCEDURE — 96365 THER/PROPH/DIAG IV INF INIT: CPT

## 2017-08-27 PROCEDURE — G0378 HOSPITAL OBSERVATION PER HR: HCPCS

## 2017-08-27 PROCEDURE — 81001 URINALYSIS AUTO W/SCOPE: CPT

## 2017-08-27 PROCEDURE — 96361 HYDRATE IV INFUSION ADD-ON: CPT

## 2017-08-27 PROCEDURE — 85025 COMPLETE CBC W/AUTO DIFF WBC: CPT

## 2017-08-27 PROCEDURE — 83690 ASSAY OF LIPASE: CPT

## 2017-08-27 PROCEDURE — 80053 COMPREHEN METABOLIC PANEL: CPT

## 2017-08-27 RX ADMIN — Medication SCH ML: at 21:00

## 2017-08-27 RX ADMIN — PANTOPRAZOLE SCH MG: 40 TABLET, DELAYED RELEASE ORAL at 11:25

## 2017-08-27 RX ADMIN — CYCLOBENZAPRINE HYDROCHLORIDE PRN MG: 10 TABLET, FILM COATED ORAL at 13:47

## 2017-08-27 RX ADMIN — CYCLOBENZAPRINE HYDROCHLORIDE PRN MG: 10 TABLET, FILM COATED ORAL at 21:08

## 2017-08-27 RX ADMIN — PHENYTOIN SODIUM SCH MLS/HR: 50 INJECTION INTRAMUSCULAR; INTRAVENOUS at 13:50

## 2017-08-27 NOTE — PD
HPI


Chief Complaint:  Abdominal Pain


Time Seen by Provider:  08:11


Travel History


International Travel<30 days:  No


Contact w/Intl Traveler<30days:  No


Traveled to known affect area:  No





History of Present Illness


HPI


This is a 57-year-old female who has a history of alcohol abuse who presents to 

the emergency department with nausea and vomiting that started last evening and 

has persisted until today with moderate severity epigastric pain, constant, 

nonradiating, with no associated fevers, chills or diarrhea.  She says her last 

drink was last night.





PFSH


Past Medical History


Anemia:  Yes


Arthritis:  Yes


Asthma:  No


Autoimmune Disease:  No


Blood Disorders:  No


Bipolar Disorder:  Yes


Anxiety:  Yes


Depression:  Yes


Heart Rhythm Problems:  No


Cancer:  No


Cardiovascular Problems:  Yes


High Cholesterol:  No


Chest Pain:  No


Congestive Heart Failure:  No


COPD:  Yes


Cerebrovascular Accident:  No


Diabetes:  No


Diminished Hearing:  No


Endocrine:  No


GERD:  Yes


Glaucoma:  No


Genitourinary:  No


Headaches:  Yes


Hepatitis:  Yes


Hiatal Hernia:  No


Hypertension:  Yes


Kidney Stones:  No


Musculoskeletal:  No


Neurologic:  No


Psychiatric:  Yes (MANIC DEPRESSIVE DISORDER, BIPOLAR)


Reproductive:  No


Respiratory:  Yes


Immunizations Current:  No


Myocardial Infarction:  No


Pancreatitis:  Yes


Pneumonia:  Yes


Renal Failure:  No


Seizures:  Yes


Sickle Cell Disease:  No


Sleep Apnea:  No


Thyroid Disease:  No


Ulcer:  No


Tetanus Vaccination:  < 5 Years


Influenza Vaccination:  No


Menopausal:  Yes





Past Surgical History


Abdominal Surgery:  Yes


AICD:  No


Arteriovenous Shunt:  No


Cardiac Surgery:  No


Ear Surgery:  No


Endocrine Surgery:  No


Eye Surgery:  No


Genitourinary Surgery:  No


Gynecologic Surgery:  Yes (HYSTERECTOMY 2000)


Hysterectomy:  Yes


Insulin Pump:  No


Joint Replacement:  No


Neurologic Surgery:  No


Oral Surgery:  No


Pacemaker:  No


Thoracic Surgery:  No


Tonsillectomy:  Yes


Other Surgery:  Yes (BACK, HAND)





Social History


Alcohol Use:  Yes (ALOT PER PT)


Tobacco Use:  Yes (1 PPD)


Substance Use:  No





Allergies-Medications


(Allergen,Severity, Reaction):  


Coded Allergies:  


     clindamycin (Unverified  Allergy, Severe, UNKNOWN REACTION, 8/27/17)


     hydroxyzine (Unverified  Allergy, Severe, "HEAD SPINS", 8/27/17)


     levofloxacin (Unverified  Allergy, Severe, UNKNOWN REACTION, 8/27/17)


     penicillin G (Unverified  Allergy, Severe, BLISTERS, 8/27/17)


     codeine (Unverified  Adverse Reaction, Severe, "EYE BLIND", 8/27/17)


 DIPLOPIA AND DIZZINESS


Reported Meds & Prescriptions





Reported Meds & Active Scripts


Active


Protonix (Pantoprazole Sodium) 40 Mg Tab 40 Mg PO DAILY


Reported


Lisinopril 20 Mg Tab 20 Mg PO DAILY








Review of Systems


Except as stated in HPI:  all other systems reviewed are Neg





Physical Exam


Narrative


GENERAL: Disheveled, tremulous


SKIN: Focused skin assessment warm and dry.


HEAD: Atraumatic. Normocephalic. 


EYES: Pupils equal and round.  No injection or drainage. 


ENT:  Moist mucous membranes


NECK: Trachea midline. 


CARDIOVASCULAR: Regular rate and rhythm.  No murmur appreciated.


RESPIRATORY: Clear to auscultation. Breath sounds equal bilaterally. 


GASTROINTESTINAL: Abdomen soft, moderately tender to palpation in the 

epigastrium with no rebound or guarding.


MUSCULOSKELETAL: No obvious deformities. 


NEUROLOGICAL: Awake and alert. No obvious cranial nerve deficits.  Moving all 

extremities.


PSYCHIATRIC: Appropriate mood and affect; insight and judgment normal.





Data


Data


Last Documented VS





Vital Signs








  Date Time  Temp Pulse Resp B/P (MAP) Pulse Ox O2 Delivery O2 Flow Rate FiO2


 


8/27/17 09:31  90 18 130/62 (84) 98 Room Air  


 


8/27/17 08:01 98.3       








Orders





 Orders


Complete Blood Count With Diff (8/27/17 08:15)


Comprehensive Metabolic Panel (8/27/17 08:15)


Lipase (8/27/17 08:15)


Urinalysis - C+S If Indicated (8/27/17 08:15)


Iv Access Insert/Monitor (8/27/17 08:15)


Ecg Monitoring (8/27/17 08:15)


Oximetry (8/27/17 08:15)


Morphine Inj (Morphine Inj) (8/27/17 08:15)


Ondansetron Inj (Zofran Inj) (8/27/17 08:15)


Sodium Chlor 0.9% 1000 Ml Inj (Ns 1000 M (8/27/17 08:15)


Sodium Chloride 0.9% Flush (Ns Flush) (8/27/17 08:15)


Lorazepam Inj (Ativan Inj) (8/27/17 08:15)


Ct Abd/Pel W Iv Contrast(Rout) (8/27/17 )


Iohexol 350 Inj (Omnipaque 350 Inj) (8/27/17 07:55)


Admit Order (Ed Use Only) (8/27/17 10:38)





Labs





Laboratory Tests








Test


  8/27/17


08:20 8/27/17


09:30


 


White Blood Count 8.1 TH/MM3  


 


Red Blood Count 4.69 MIL/MM3  


 


Hemoglobin 14.6 GM/DL  


 


Hematocrit 43.3 %  


 


Mean Corpuscular Volume 92.4 FL  


 


Mean Corpuscular Hemoglobin 31.2 PG  


 


Mean Corpuscular Hemoglobin


Concent 33.7 % 


  


 


 


Red Cell Distribution Width 12.9 %  


 


Platelet Count 253 TH/MM3  


 


Mean Platelet Volume 7.9 FL  


 


Neutrophils (%) (Auto) 86.2 %  


 


Lymphocytes (%) (Auto) 7.5 %  


 


Monocytes (%) (Auto) 6.0 %  


 


Eosinophils (%) (Auto) 0.1 %  


 


Basophils (%) (Auto) 0.2 %  


 


Neutrophils # (Auto) 7.0 TH/MM3  


 


Lymphocytes # (Auto) 0.6 TH/MM3  


 


Monocytes # (Auto) 0.5 TH/MM3  


 


Eosinophils # (Auto) 0.0 TH/MM3  


 


Basophils # (Auto) 0.0 TH/MM3  


 


CBC Comment DIFF FINAL  


 


Differential Comment   


 


Blood Urea Nitrogen 14 MG/DL  


 


Creatinine 1.01 MG/DL  


 


Random Glucose 149 MG/DL  


 


Total Protein 9.3 GM/DL  


 


Albumin 4.3 GM/DL  


 


Calcium Level 9.4 MG/DL  


 


Alkaline Phosphatase 83 U/L  


 


Aspartate Amino Transf


(AST/SGOT) 34 U/L 


  


 


 


Alanine Aminotransferase


(ALT/SGPT) 35 U/L 


  


 


 


Total Bilirubin 0.3 MG/DL  


 


Sodium Level 120 MEQ/L  


 


Potassium Level 4.0 MEQ/L  


 


Chloride Level 83 MEQ/L  


 


Carbon Dioxide Level 28.1 MEQ/L  


 


Anion Gap 9 MEQ/L  


 


Estimat Glomerular Filtration


Rate 56 ML/MIN 


  


 


 


Lipase 182 U/L  


 


Urine Color  LIGHT-YELLOW 


 


Urine Turbidity  CLEAR 


 


Urine pH  6.5 


 


Urine Specific Gravity  1.007 


 


Urine Protein  NEG mg/dL 


 


Urine Glucose (UA)  70 mg/dL 


 


Urine Ketones  NEG mg/dL 


 


Urine Occult Blood  NEG 


 


Urine Nitrite  NEG 


 


Urine Bilirubin  NEG 


 


Urine Urobilinogen


  


  LESS THAN 2.0


MG/DL


 


Urine Leukocyte Esterase  NEG 


 


Urine RBC


  


  LESS THAN 1


/hpf


 


Urine WBC  1 /hpf 


 


Urine Mucus  FEW /lpf 


 


Microscopic Urinalysis Comment


  


  CULT NOT


INDICATED











MDM


Medical Decision Making


Medical Screen Exam Complete:  Yes


Emergency Medical Condition:  Yes


Interpretation(s)


No leukocytosis


86% neutrophils


Hyponatremic


Lipase is normal





Last 24 hours Impressions








Abdomen/Pelvis CT 8/27/17 0000 Signed





Impressions: 





 Service Date/Time:  Sunday, August 27, 2017 09:42 - CONCLUSION: Normal 





 examination.       Nita Martin MD 








Differential Diagnosis


Alcoholic gastritis, peptic ulcer disease, pancreatitis, dehydration


Narrative Course


This is a 57-year-old female who presents to the emergency department with 

nausea vomiting and epigastric abdominal pain.  Clinically I suspect she has 

alcoholic gastritis.  Labs are obtained which demonstrate a sodium of 120.  CT 

abdomen and pelvis was obtained which was reassuring.  Patient will be admitted 

in the setting of hyponatremia and intractable vomiting





Physician Communication


Physician Communication


Discussed with Dr. Torres





Diagnosis





 Primary Impression:  


 Hyponatremia


 Additional Impression:  


 Alcoholic gastritis


 Qualified Codes:  K29.20 - Alcoholic gastritis without bleeding





Admitting Information


Admitting Physician Requests:  Lois Francis MD Aug 27, 2017 11:37

## 2017-08-27 NOTE — HHI.HP
__________________________________________________





HPI


Service


[facility]


Primary Care Physician


Sami Stephens M.D.


Admission Diagnosis





hyponatremia, alcoholic gastritis


Diagnoses:  


Chief Complaint:  


nausea/vomiting, epigastric pain


Travel History


International Travel<30 Days:  No


Contact w/Intl Traveler <30 Da:  No


Traveled to Known Affected Are:  No


History of Present Illness


Written by Kaity Steiner, acting as scribe for Dr. Torres on 17 at 12:

25hrs





57-year-old female with history of alcohol abuse, bipolar disorder, anxiety/

depression, hypertension, hepatitis C, chronic back pain, presents with a 1 day 

history of nausea, vomiting, and abdominal pain. The patient reports last night 

she started to experience a mild "belly ache", it persisted throughout the night

, then became very severe, and she was unable to sleep. She locates the pain to 

the epigastric region, described as constant, severe pains. She denies any fever

/chills, hematemesis, diarrhea, hematochezia, or melena. She checked her BP 

which was 220/118 so she took her lisinopril and called [facility]. En route, her 

blood pressure improved to 178/100. She denies missing any medications 

recently. She admits to being an "alcoholic", last drank 2 beers yesterday, 

however no alcohol in the previous 3 days. Sometimes she binges on beer but not 

recently. Denies any history of alcohol withdrawal seizures. The patient also 

reports chronic back pain, was previously on oral morphine however took herself 

off of this. She has an appointment with her new pain management physician Dr. Miranda tomorrow at 3pm. She also takes Vistaril 25mg prn for anxiety. The 

patient has no other medical complaints to report at this time. Currently her 

abdominal pain is much improved and she had 1 episode of vomiting while in the 

ED but none since.





Review of Systems


Except as stated in HPI:  all other systems reviewed are Neg





Past Family Social History


Past Medical History


Bipolar disorder


Anxiety


Depression


COPD


GERD


Hepatitis C


Hypertension


Past Surgical History


Hysterectomy


Tonsillectomy


Back surgery


Hand surgery


Reported Medications





Protonix (Pantoprazole Sodium) 40 Mg Tab 40 Mg PO DAILY


Flexeril (Cyclobenzaprine HCl) 5 Mg Tab 5 Mg PO TID


Lisinopril 20 Mg Tab 20 Mg PO DAILY


Allergies:  


Coded Allergies:  


     clindamycin (Unverified  Allergy, Severe, UNKNOWN REACTION, 17)


     hydroxyzine (Unverified  Allergy, Severe, "HEAD SPINS", 17)


     levofloxacin (Unverified  Allergy, Severe, UNKNOWN REACTION, 17)


     penicillin G (Unverified  Allergy, Severe, BLISTERS, 17)


     codeine (Unverified  Adverse Reaction, Severe, "EYE BLIND", 17)


 DIPLOPIA AND DIZZINESS


Active Ordered Medications





Current Medications








 Medications


  (Trade)  Dose


 Ordered  Sig/Vel


 Route  Start Time


 Stop Time Status Last Admin


 


  (NS Flush)  2 ml  UNSCH  PRN


 IV FLUSH  17 10:45


     


 


 


  (NS Flush)  2 ml  BID


 IV FLUSH  17 21:00


     


 


 


  (Folate)  1 mg  DAILY


 PO  17 09:00


 17 08:59   


 


 


  (Vitamin B1)  100 mg  DAILY


 PO  17 09:00


     


 


 


  (Theragran M Tab)  1 tab  DAILY


 PO  17 09:00


 17 08:59   


 


 


  (Zofran Inj)  4 mg  Q6H  PRN


 IV  17 10:45


     


 


 


  (Protonix)  40 mg  DAILY


 PO  17 11:00


    17 11:25


 


 


  (Romazicon Inj)  0.2 mg  Q1M  PRN


 IV PUSH  17 10:45


     


 


 


  (Ativan)  1 mg  Q4H  PRN


 PO  17 10:45


     


 


 


  (Ativan Inj)  1 mg  Q4H  PRN


 IV PUSH  17 10:45


     


 


 


  (Ativan)  2 mg  Q2H  PRN


 PO  17 10:45


     


 


 


  (Ativan Inj)  2 mg  Q2H  PRN


 IV PUSH  17 10:45


     


 


 


  (Ativan Inj)  2 mg  Q1H  PRN


 IV PUSH  17 10:45


     


 


 


  (Ativan Inj)  2 mg  Q15M  PRN


 IV PUSH  17 10:45


     


 








Family History


Mother with breast cancer, brain cancer, in remission


Father with throat cancer, in remission, age 90


Social History


Smokes tobacco 1 PPD since age 16


Drinks a varied amount of alcohol, mostly a few beers a few times per week, 

sometimes binges on beer


Smokes marijuana occasionally, denies any other illicit drug use





Physical Exam


Vital Signs





Vital Signs








  Date Time  Temp Pulse Resp B/P (MAP) Pulse Ox O2 Delivery O2 Flow Rate FiO2


 


17 11:45        


 


17 11:33  88 18 139/68 (91) 98 Room Air  


 


17 09:31  90 18 130/62 (84) 98 Room Air  


 


17 08:08   18     


 


17 08:01 98.3 98 18 170/100 (123) 98   








Physical Exam


GENERAL: Well-nourished, well-developed middle aged female patient in NAD.


SKIN: Warm and dry. No rash.


HEAD:  Normocephalic. Atraumatic.


EYES: Pupils equal and round. No scleral icterus. No injection or drainage. 


ENT: No nasal bleeding or discharge.  Mucous membranes pink and moist.


NECK: Supple. Trachea midline.  


CARDIOVASCULAR: Regular rate and rhythm.  S1, S2 noted. No murmur appreciated. 


RESPIRATORY: No accessory muscle use. Clear to auscultation. Breath sounds 

equal bilaterally.  


GASTROINTESTINAL: Abdomen soft, nondistended, minimal epigastric TTP. 

Normoactive bowel sounds x4.


MUSCULOSKELETAL: No obvious deformities. Extremities without clubbing, cyanosis

, or edema. 


NEUROLOGICAL: Awake and alert. No obvious cranial nerve deficits.  Motor 

grossly within normal limits. 5/5 muscle strength in bilateral upper and lower 

extremities.  Normal speech.


PSYCHIATRIC: Appropriate mood and affect; insight and judgment normal.


Laboratory





Laboratory Tests








Test


  17


08:20 17


09:30


 


White Blood Count 8.1  


 


Red Blood Count 4.69  


 


Hemoglobin 14.6  


 


Hematocrit 43.3  


 


Mean Corpuscular Volume 92.4  


 


Mean Corpuscular Hemoglobin 31.2  


 


Mean Corpuscular Hemoglobin


Concent 33.7 


  


 


 


Red Cell Distribution Width 12.9  


 


Platelet Count 253  


 


Mean Platelet Volume 7.9  


 


Neutrophils (%) (Auto) 86.2  


 


Lymphocytes (%) (Auto) 7.5  


 


Monocytes (%) (Auto) 6.0  


 


Eosinophils (%) (Auto) 0.1  


 


Basophils (%) (Auto) 0.2  


 


Neutrophils # (Auto) 7.0  


 


Lymphocytes # (Auto) 0.6  


 


Monocytes # (Auto) 0.5  


 


Eosinophils # (Auto) 0.0  


 


Basophils # (Auto) 0.0  


 


CBC Comment DIFF FINAL  


 


Differential Comment   


 


Blood Urea Nitrogen 14  


 


Creatinine 1.01  


 


Random Glucose 149  


 


Total Protein 9.3  


 


Albumin 4.3  


 


Calcium Level 9.4  


 


Alkaline Phosphatase 83  


 


Aspartate Amino Transf


(AST/SGOT) 34 


  


 


 


Alanine Aminotransferase


(ALT/SGPT) 35 


  


 


 


Total Bilirubin 0.3  


 


Sodium Level 120  


 


Potassium Level 4.0  


 


Chloride Level 83  


 


Carbon Dioxide Level 28.1  


 


Anion Gap 9  


 


Estimat Glomerular Filtration


Rate 56 


  


 


 


Lipase 182  


 


Urine Color  LIGHT-YELLOW 


 


Urine Turbidity  CLEAR 


 


Urine pH  6.5 


 


Urine Specific Gravity  1.007 


 


Urine Protein  NEG 


 


Urine Glucose (UA)  70 


 


Urine Ketones  NEG 


 


Urine Occult Blood  NEG 


 


Urine Nitrite  NEG 


 


Urine Bilirubin  NEG 


 


Urine Urobilinogen  LESS THAN 2.0 


 


Urine Leukocyte Esterase  NEG 


 


Urine RBC  LESS THAN 1 


 


Urine WBC  1 


 


Urine Mucus  FEW 


 


Microscopic Urinalysis Comment


  


  CULT NOT


INDICATED


 


Urine Opiates Screen  POS 


 


Urine Barbiturates Screen  NEG 


 


Urine Amphetamines Screen  NEG 


 


Urine Benzodiazepines Screen  NEG 


 


Urine Cocaine Screen  NEG 


 


Urine Cannabinoids Screen  NEG 








Result Diagram:  


17 0820                                                                   

             17 0820





Imaging





Last Impressions








Abdomen/Pelvis CT 17 0000 Signed





Impressions: 





 Service Date/Time:  2017 09:42 - CONCLUSION: Normal 





 examination.       Jacqueline M. Bernard, MD Caprini VTE Risk Assessment


Caprini VTE Risk Assessment:  No/Low Risk (score <= 1)


Caprini Risk Assessment Model











 Point Value = 1          Point Value = 2  Point Value = 3  Point Value = 5


 


Age 41-60


Minor surgery


BMI > 25 kg/m2


Swollen legs


Varicose veins


Pregnancy or postpartum


History of unexplained or recurrent


   spontaneous 


Oral contraceptives or hormone


   replacement


Sepsis (< 1 month)


Serious lung disease, including


   pneumonia (< 1 month)


Abnormal pulmonary function


Acute myocardial infarction


Congestive heart failure (< 1 month)


History of inflammatory bowel disease


Medical patient at bed rest Age 61-74


Arthroscopic surgery


Major open surgery (> 45 min)


Laparoscopic surgery (> 45 min)


Malignancy


Confined to bed (> 72 hours)


Immobilizing plaster cast


Central venous access Age >= 75


History of VTE


Family history of VTE


Factor V Leiden


Prothrombin 56345U


Lupus anticoagulant


Anticardiolipin antibodies


Elevated serum homocysteine


Heparin-induced thrombocytopenia


Other congenital or acquired


   thrombophilia Stroke (< 1 month)


Elective arthroplasty


Hip, pelvis, or leg fracture


Acute spinal cord injury (< 1 month)








Prophylaxis Regimen











   Total Risk


Factor Score Risk Level Prophylaxis Regimen


 


0-1      Low Early ambulation


 


2 Moderate Order ONE of the following:


*Sequential Compression Device (SCD)


*Heparin 5000 units SQ BID


 


3-4 Higher Order ONE of the following medications:


*Heparin 5000 units SQ TID


*Enoxaparin/Lovenox 40 mg SQ daily (WT < 150 kg, CrCl > 30 mL/min)


*Enoxaparin/Lovenox 30 mg SQ daily (WT < 150 kg, CrCl > 10-29 mL/min)


*Enoxaparin/Lovenox 30 mg SQ BID (WT < 150 kg, CrCl > 30 mL/min)


AND/OR


*Sequential Compression Device (SCD)


 


5 or more Highest Order ONE of the following medications:


*Heparin 5000 units SQ TID (Preferred with Epidurals)


*Enoxaparin/Lovenox 40 mg SQ daily (WT < 150 kg, CrCl > 30 mL/min)


*Enoxaparin/Lovenox 30 mg SQ daily (WT < 150 kg, CrCl > 10-29 mL/min)


*Enoxaparin/Lovenox 30 mg SQ BID (WT < 150 kg, CrCl > 30 mL/min)


AND


*Sequential Compression Device (SCD)











Assessment and Plan


Problem List:  


(1) Hyponatremia


ICD Code:  E87.1 - Hyponatremia


Status:  Acute


(2) Abdominal pain, epigastric


ICD Code:  R10.13 - Abdominal pain, epigastric


Status:  Acute


(3) Nausea & vomiting


ICD Code:  R11.2 - Nausea & vomiting


Status:  Acute


(4) HTN (hypertension)


ICD Code:  I10 - HTN (hypertension)


Status:  Acute


Assessment and Plan


57-year-old female with history of alcohol abuse, bipolar disorder, anxiety/

depression, hypertension, hepatitis C, presents with a 1 day history of nausea, 

vomiting, and abdominal pain. 





Alcoholic Gastritis: suspected, patient drinks heavy amounts of alcohol. CT abd/

pelvis images reviewed, no acute findings. Lipase and LFTs wnl. Afebrile, no 

leukocytosis


   -Start on Protonix 40mg daily


   -Continue supportive treatment with IVF, antiemetics prn


   -Diet as tolerated





Hyponatremia: Na 120, suspect secondary to alcohol abuse in combination with 

dehydration/vomiting. S/p IVF bolus x1L in the ED. 


   -continue on IVF with NS @ 100cc/hr


   -repeat Na in am





Alcohol Abuse: high risk for withdrawal


   -Start on thiamine/folate/MV


   -Start CIWA protocol


   -counseled on cessation


   -seizure precautions





Hypertension: chronic


   -continue patient's lisinopril


   -monitor BP, adjust antihypertensives as needed





Chronic Back Pain: follows with pain management


   -patient has appt tomorrow  with Dr. Miranda at 3pm, continue outpatient f/

up





Hepatitis C: chronic


   -patient reportedly undergoing work up to start hepatitis treatment


   -continue outpatient f/up





DVT Prophylaxis: teds/SCDs








This note was transcribed by rosemarie [Kaity Steiner].  I, Dr. Caitlin Torres 

personally performed the history, physical exam, and medical decision making; 

and confirmed the accuracy of the information in the transcribed note.





Authenticated by Dr. Caitlin Torres on 17 at 1235.


Discussed Condition With


Patient, FAREED BROWER, Kaity Aguilar RN, PA-C Aug 27, 2017 12:23


Caitlin Torres MD Aug 27, 2017 13:04

## 2017-08-27 NOTE — RADRPT
EXAM DATE/TIME:  08/27/2017 09:42 

 

[facility] COMPARISON:     

CT ABDOMEN & PELVIS W CONTRAST, July 07, 2015, 17:03.

 

 

INDICATIONS :     

Mid-epigastric pain.

                      

 

IV CONTRAST:     

100 cc Omnipaque 350 (iohexol) IV 

 

 

ORAL CONTRAST:      

No oral contrast ingested.

                      

 

RADIATION DOSE:     

9.96 CTDIvol (mGy) 

 

 

MEDICAL HISTORY :     

Chronic obstructive pulmonary disease. Cardiovascular disease Hypertension.pancreatitis

 

SURGICAL HISTORY :      

Hysterectomy. 

 

ENCOUNTER:      

Initial

 

ACUITY:      

1 day

 

PAIN SCALE:      

10/10

 

LOCATION:       

Bilateral  abdomen.

 

TECHNIQUE:     

Volumetric scanning of the abdomen and pelvis was performed.  Using automated exposure control and ad
justment of the mA and/or kV according to patient size, radiation dose was kept as low as reasonably 
achievable to obtain optimal diagnostic quality images.  DICOM format image data is available electro
nically for review and comparison.  

 

FINDINGS:     

 

LOWER LUNGS:     

The visualized lower lungs are clear.

 

LIVER:     

Homogeneous density without lesion.  There is no dilation of the biliary tree.  No calcified gallston
es.

 

SPLEEN:     

Normal size without lesion.

 

PANCREAS:     

Within normal limits.

 

KIDNEYS:     

Normal in size and shape.  There is no concerning mass, stone or hydronephrosis. Small benign left re
nal cyst.

 

ADRENAL GLANDS:     

Within normal limits.

 

VASCULAR:     

There is no aortic aneurysm.

 

BOWEL/MESENTERY:     

The stomach, small bowel, and colon demonstrate no acute abnormality.  There is no free intraperitone
al air or fluid.

 

ABDOMINAL WALL:     

Within normal limits.

 

RETROPERITONEUM:     

There is no lymphadenopathy. 

 

BLADDER:     

No wall thickening or mass. 

 

REPRODUCTIVE:     

Within normal limits.

 

INGUINAL:     

There is no lymphadenopathy or hernia. 

 

MUSCULOSKELETAL:     

Within normal limits for patient age. 

 

CONCLUSION:     Normal examination.  

 

 

 

 Nita Martin MD on August 27, 2017 at 10:00           

Board Certified Radiologist.

 This report was verified electronically.

## 2017-08-28 VITALS
TEMPERATURE: 98 F | DIASTOLIC BLOOD PRESSURE: 78 MMHG | SYSTOLIC BLOOD PRESSURE: 140 MMHG | HEART RATE: 75 BPM | OXYGEN SATURATION: 97 % | RESPIRATION RATE: 20 BRPM

## 2017-08-28 VITALS
TEMPERATURE: 97.7 F | HEART RATE: 69 BPM | SYSTOLIC BLOOD PRESSURE: 182 MMHG | RESPIRATION RATE: 19 BRPM | DIASTOLIC BLOOD PRESSURE: 83 MMHG | OXYGEN SATURATION: 98 %

## 2017-08-28 VITALS
HEART RATE: 82 BPM | RESPIRATION RATE: 18 BRPM | SYSTOLIC BLOOD PRESSURE: 105 MMHG | OXYGEN SATURATION: 97 % | TEMPERATURE: 98.1 F | DIASTOLIC BLOOD PRESSURE: 64 MMHG

## 2017-08-28 LAB
ALP SERPL-CCNC: 55 U/L (ref 45–117)
ALT SERPL-CCNC: 25 U/L (ref 10–53)
ANION GAP SERPL CALC-SCNC: 8 MEQ/L (ref 5–15)
AST SERPL-CCNC: 19 U/L (ref 15–37)
BILIRUB SERPL-MCNC: 0.2 MG/DL (ref 0.2–1)
BUN SERPL-MCNC: 10 MG/DL (ref 7–18)
CHLORIDE SERPL-SCNC: 95 MEQ/L (ref 98–107)
GFR SERPLBLD BASED ON 1.73 SQ M-ARVRAT: 66 ML/MIN (ref 89–?)
HCO3 BLD-SCNC: 27.7 MEQ/L (ref 21–32)
POTASSIUM SERPL-SCNC: 3.3 MEQ/L (ref 3.5–5.1)
SODIUM SERPL-SCNC: 131 MEQ/L (ref 136–145)

## 2017-08-28 RX ADMIN — Medication SCH ML: at 08:27

## 2017-08-28 RX ADMIN — CYCLOBENZAPRINE HYDROCHLORIDE PRN MG: 10 TABLET, FILM COATED ORAL at 06:07

## 2017-08-28 RX ADMIN — PANTOPRAZOLE SCH MG: 40 TABLET, DELAYED RELEASE ORAL at 08:27

## 2017-08-28 RX ADMIN — MAGNESIUM SULFATE IN DEXTROSE SCH MLS/HR: 10 INJECTION, SOLUTION INTRAVENOUS at 08:26

## 2017-08-28 RX ADMIN — PHENYTOIN SODIUM SCH MLS/HR: 50 INJECTION INTRAMUSCULAR; INTRAVENOUS at 00:19

## 2017-08-28 RX ADMIN — MAGNESIUM SULFATE IN DEXTROSE SCH MLS/HR: 10 INJECTION, SOLUTION INTRAVENOUS at 09:26

## 2017-08-28 NOTE — HHI.DCPOC
Discharge Care Plan


Diagnosis:  


(1) Alcoholic gastritis


(2) Nausea & vomiting


(3) Abdominal pain, epigastric


Goals to Promote Your Health


* To prevent worsening of your condition and complications


* To maintain your health at the optimal level


Directions to Meet Your Goals


*** Take your medications as prescribed


*** Follow your dietary instruction


*** Follow activity as directed








*** Keep your appointments as scheduled


*** Take your immunizations and boosters as scheduled


*** If your symptoms worsen call your PCP, if no PCP go to Urgent Care Center 

or Emergency Room***


*** Smoking is Dangerous to Your Health. Avoid second hand smoke***


***Call the 24-hour hour crisis hotline for domestic abuse at [phone]***











Kaity Steiner PA-C Aug 28, 2017 8:02 am

## 2017-08-28 NOTE — HHI.PR
Subjective


Remarks


Follow up for epigastric pain, nausea/vomiting. The patient reports feeling 

much better today. She is ambulating the hallway this morning without 

difficulty. She denies any further abdominal pain, nausea, or vomiting. She 

tolerated dinner last night. She wants to go home. She has appointment with her 

pain management physician today at 3pm.





Objective


Vitals





Vital Signs








  Date Time  Temp Pulse Resp B/P (MAP) Pulse Ox O2 Delivery O2 Flow Rate FiO2


 


8/28/17 07:10 97.7 69 19 182/83 (116) 98   


 


8/28/17 04:14 98.0 75 20 140/78 (98) 97   


 


8/28/17 00:16 98.1 82 18 105/64 (78) 97   


 


8/27/17 20:57 98.4 84 20 125/59 (81) 96   


 


8/27/17 16:29 98.2 80 18 148/60 (89) 96   


 


8/27/17 12:44 97.0 79 20 150/79 (102) 96   


 


8/27/17 11:45        


 


8/27/17 11:33  88 18 139/68 (91) 98 Room Air  


 


8/27/17 09:31  90 18 130/62 (84) 98 Room Air  


 


8/27/17 08:08   18     














I/O      


 


 8/27/17 8/27/17 8/27/17 8/28/17 8/28/17 8/28/17





 07:00 15:00 23:00 07:00 15:00 23:00


 


Intake Total  1000 ml  1000 ml  


 


Balance  1000 ml  1000 ml  


 


      


 


Intake IV Total  1000 ml  1000 ml  


 


# Voids   1   








Result Diagram:  


8/27/17 0820                                                                   

             8/28/17 0400





Imaging





Last Impressions








Abdomen/Pelvis CT 8/27/17 0000 Signed





Impressions: 





 Service Date/Time:  Sunday, August 27, 2017 09:42 - CONCLUSION: Normal 





 examination.       Nita Martin MD 








Objective Remarks


GENERAL: Well-nourished, well-developed pleasant middle aged female patient in 

NAD.


SKIN: Warm and dry. No rash.


HEENT:  Normocephalic. Atraumatic.Pupils equal and round. Mucous membranes pink 

and moist.


NECK: Supple. Trachea midline.  


CARDIOVASCULAR: Regular rate and rhythm.  S1, S2 noted. No murmur appreciated. 


RESPIRATORY: No accessory muscle use. Clear to auscultation. Breath sounds 

equal bilaterally.  


GASTROINTESTINAL: Abdomen soft, non-tender, nondistended. Normoactive bowel 

sounds x4.


MUSCULOSKELETAL: No obvious deformities. Extremities without clubbing, cyanosis

, or edema. 


NEUROLOGICAL: Awake and alert. No obvious cranial nerve deficits.  Motor 

grossly within normal limits.  Normal speech.


PSYCHIATRIC: Appropriate mood and affect; insight and judgment normal.


Medications and IVs





Current Medications








 Medications


  (Trade)  Dose


 Ordered  Sig/Vel


 Route  Start Time


 Stop Time Status Last Admin


 


  (NS Flush)  2 ml  UNSCH  PRN


 IV FLUSH  8/27/17 10:45


     


 


 


  (NS Flush)  2 ml  BID


 IV FLUSH  8/27/17 21:00


     


 


 


  (Folate)  1 mg  DAILY


 PO  8/28/17 09:00


 9/2/17 08:59   


 


 


  (Vitamin B1)  100 mg  DAILY


 PO  8/28/17 09:00


     


 


 


  (Theragran M Tab)  1 tab  DAILY


 PO  8/28/17 09:00


 9/2/17 08:59   


 


 


  (Zofran Inj)  4 mg  Q6H  PRN


 IV  8/27/17 10:45


     


 


 


  (Protonix)  40 mg  DAILY


 PO  8/27/17 11:00


    8/27/17 11:25


 


 


  (Romazicon Inj)  0.2 mg  Q1M  PRN


 IV PUSH  8/27/17 10:45


     


 


 


  (Ativan)  1 mg  Q4H  PRN


 PO  8/27/17 10:45


    8/27/17 13:51


 


 


  (Ativan Inj)  1 mg  Q4H  PRN


 IV PUSH  8/27/17 10:45


     


 


 


  (Ativan)  2 mg  Q2H  PRN


 PO  8/27/17 10:45


     


 


 


  (Ativan Inj)  2 mg  Q2H  PRN


 IV PUSH  8/27/17 10:45


     


 


 


  (Ativan Inj)  2 mg  Q1H  PRN


 IV PUSH  8/27/17 10:45


     


 


 


  (Ativan Inj)  2 mg  Q15M  PRN


 IV PUSH  8/27/17 10:45


     


 


 


 Sodium Chloride  1,000 ml @ 


 100 mls/hr  Q10H


 IV  8/27/17 14:00


    8/28/17 00:19


 


 


  (Flexeril)  5 mg  TID  PRN


 PO  8/27/17 13:00


    8/28/17 06:07


 


 


  (Prinivil)  20 mg  DAILY


 PO  8/28/17 09:00


     


 


 


 Magnesium Sulfate/


 Dextrose  100 ml @ 


 100 mls/hr  Q1H


 IV  8/28/17 08:00


 8/28/17 09:59 UNV  


 











A/P


Problem List:  


(1) Hyponatremia


ICD Code:  E87.1 - Hyponatremia


Status:  Acute


(2) Abdominal pain, epigastric


ICD Code:  R10.13 - Abdominal pain, epigastric


Status:  Acute


(3) Nausea & vomiting


ICD Code:  R11.2 - Nausea & vomiting


Status:  Acute


(4) HTN (hypertension)


ICD Code:  I10 - HTN (hypertension)


Status:  Acute


Assessment and Plan


57-year-old female with history of alcohol abuse, bipolar disorder, anxiety/

depression, hypertension, hepatitis C, presents with a 1 day history of nausea, 

vomiting, and abdominal pain. 





Alcoholic Gastritis: suspected, patient drinks heavy amounts of alcohol. CT abd/

pelvis images reviewed, no acute findings. Lipase and LFTs wnl. Afebrile, no 

leukocytosis


   -Started on Protonix 40mg daily


   -Continue supportive treatment with IVF, antiemetics prn


   -Diet as tolerated


   -symptoms resolved





Hyponatremia: Na 120, suspect secondary to alcohol abuse in combination with 

dehydration/vomiting. S/p IVF bolus x1L in the ED. 


   -continue on IVF with NS @ 100cc/hr


   -repeat Na 131, will d/c fluids, encouraged continuing oral hydration after 

discharge





Hypokalemia/Hypomagnesemia: K 3.3, Mag 1.5. Suspect secondary to dehydration 

with recent vomiting. 


   -given po KCl and IV Mag replacement


   -encouraged oral hydration and replenishing electrolytes with Gatorade





Alcohol Abuse: high risk for withdrawal


   -Start on thiamine/folate/MV


   -Start CIWA protocol


   -counseled on cessation


   -seizure precautions


   -no signs of withdrawal at this time





Hypertension: chronic


   -continue patient's lisinopril


   -monitor BP, adjust antihypertensives as needed 





Chronic Back Pain: follows with pain management


   -patient has appt today 8/28 with Dr. Miranda at 3pm, continue outpatient f/up





Hepatitis C: chronic


   -patient reportedly undergoing work up to start hepatitis treatment


   -continue outpatient f/up





DVT Prophylaxis: teds/SCDs


Discharge Planning


Discharge patient to home


Condition on discharge: Improved


Heart Healthy Diet as tolerated


[place] activity


Rx written: Protonix 40mg daily


Follow-up with primary care physician Dr. Stephens within 1 week











Kaity Steiner PA-C Aug 28, 2017 8:07 am

## 2017-09-02 ENCOUNTER — HOSPITAL ENCOUNTER (EMERGENCY)
Dept: HOSPITAL 17 - NEPE | Age: 57
Discharge: HOME | End: 2017-09-02
Payer: COMMERCIAL

## 2017-09-02 VITALS
DIASTOLIC BLOOD PRESSURE: 115 MMHG | TEMPERATURE: 97.5 F | SYSTOLIC BLOOD PRESSURE: 184 MMHG | OXYGEN SATURATION: 99 % | RESPIRATION RATE: 20 BRPM | HEART RATE: 122 BPM

## 2017-09-02 VITALS
RESPIRATION RATE: 16 BRPM | SYSTOLIC BLOOD PRESSURE: 193 MMHG | HEART RATE: 112 BPM | DIASTOLIC BLOOD PRESSURE: 105 MMHG | OXYGEN SATURATION: 98 %

## 2017-09-02 VITALS
RESPIRATION RATE: 15 BRPM | SYSTOLIC BLOOD PRESSURE: 200 MMHG | HEART RATE: 97 BPM | DIASTOLIC BLOOD PRESSURE: 98 MMHG | OXYGEN SATURATION: 95 %

## 2017-09-02 VITALS — HEIGHT: 65 IN | BODY MASS INDEX: 23.88 KG/M2 | WEIGHT: 143.3 LBS

## 2017-09-02 DIAGNOSIS — F17.200: ICD-10-CM

## 2017-09-02 DIAGNOSIS — Z79.899: ICD-10-CM

## 2017-09-02 DIAGNOSIS — R10.13: Primary | ICD-10-CM

## 2017-09-02 DIAGNOSIS — I10: ICD-10-CM

## 2017-09-02 LAB
ALP SERPL-CCNC: 77 U/L (ref 45–117)
ALT SERPL-CCNC: 33 U/L (ref 10–53)
ANION GAP SERPL CALC-SCNC: 8 MEQ/L (ref 5–15)
APTT BLD: 29.4 SEC (ref 24.3–30.1)
AST SERPL-CCNC: 23 U/L (ref 15–37)
BASOPHILS # BLD AUTO: 0 TH/MM3 (ref 0–0.2)
BASOPHILS NFR BLD: 0.3 % (ref 0–2)
BILIRUB SERPL-MCNC: 0.3 MG/DL (ref 0.2–1)
BUN SERPL-MCNC: 4 MG/DL (ref 7–18)
CHLORIDE SERPL-SCNC: 91 MEQ/L (ref 98–107)
EOSINOPHIL # BLD: 0.1 TH/MM3 (ref 0–0.4)
EOSINOPHIL NFR BLD: 0.6 % (ref 0–4)
ERYTHROCYTE [DISTWIDTH] IN BLOOD BY AUTOMATED COUNT: 12.9 % (ref 11.6–17.2)
GFR SERPLBLD BASED ON 1.73 SQ M-ARVRAT: 72 ML/MIN (ref 89–?)
HCO3 BLD-SCNC: 30.5 MEQ/L (ref 21–32)
HCT VFR BLD CALC: 39 % (ref 35–46)
HEMO FLAGS: (no result)
INR PPP: 1.1 RATIO
LYMPHOCYTES # BLD AUTO: 0.7 TH/MM3 (ref 1–4.8)
LYMPHOCYTES NFR BLD AUTO: 7 % (ref 9–44)
MCH RBC QN AUTO: 32.8 PG (ref 27–34)
MCHC RBC AUTO-ENTMCNC: 35.4 % (ref 32–36)
MCV RBC AUTO: 92.7 FL (ref 80–100)
MONOCYTES NFR BLD: 8.2 % (ref 0–8)
NEUTROPHILS # BLD AUTO: 8 TH/MM3 (ref 1.8–7.7)
NEUTROPHILS NFR BLD AUTO: 83.9 % (ref 16–70)
PLATELET # BLD: 288 TH/MM3 (ref 150–450)
POTASSIUM SERPL-SCNC: 3.6 MEQ/L (ref 3.5–5.1)
PROTHROMBIN TIME: 12.5 SEC (ref 9.8–11.6)
RBC # BLD AUTO: 4.21 MIL/MM3 (ref 4–5.3)
SODIUM SERPL-SCNC: 129 MEQ/L (ref 136–145)
WBC # BLD AUTO: 9.6 TH/MM3 (ref 4–11)

## 2017-09-02 PROCEDURE — 83690 ASSAY OF LIPASE: CPT

## 2017-09-02 PROCEDURE — 99285 EMERGENCY DEPT VISIT HI MDM: CPT

## 2017-09-02 PROCEDURE — 85730 THROMBOPLASTIN TIME PARTIAL: CPT

## 2017-09-02 PROCEDURE — 80053 COMPREHEN METABOLIC PANEL: CPT

## 2017-09-02 PROCEDURE — 85025 COMPLETE CBC W/AUTO DIFF WBC: CPT

## 2017-09-02 PROCEDURE — 96361 HYDRATE IV INFUSION ADD-ON: CPT

## 2017-09-02 PROCEDURE — 96375 TX/PRO/DX INJ NEW DRUG ADDON: CPT

## 2017-09-02 PROCEDURE — 96374 THER/PROPH/DIAG INJ IV PUSH: CPT

## 2017-09-02 PROCEDURE — 93005 ELECTROCARDIOGRAM TRACING: CPT

## 2017-09-02 PROCEDURE — 85610 PROTHROMBIN TIME: CPT

## 2017-09-02 PROCEDURE — 74020: CPT

## 2017-09-02 PROCEDURE — 71010: CPT

## 2017-09-02 PROCEDURE — 83735 ASSAY OF MAGNESIUM: CPT

## 2017-09-02 NOTE — PD
HPI


.


Shaking, abdominal pain, nausea


Chief Complaint:  GI Complaint


Time Seen by Provider:  09:02


Travel History


International Travel<30 days:  No


Contact w/Intl Traveler<30days:  No


Traveled to known affect area:  No





History of Present Illness


HPI


This patient presents complaining with the acute onset of shaking, epigastric 

abdominal pain, nausea and vomiting.  She describes her abdominal pain as a 

constant pressure-like sensation.  He rates the pain 10/10.  She denies any 

modifying factors.  She states that she had similar symptoms about 2 weeks ago 

and was seen here at this hospital for same.  She does not know exactly what 

her treatment was but she states that it worked.





PFSH


Past Medical History


Anemia:  Yes


Arthritis:  Yes


Asthma:  No


Autoimmune Disease:  No


Blood Disorders:  No


Bipolar Disorder:  Yes


Anxiety:  Yes


Depression:  Yes


Heart Rhythm Problems:  No


Cancer:  No


Cardiovascular Problems:  No


High Cholesterol:  No


Chemotherapy:  No


Chest Pain:  No


Congestive Heart Failure:  No


COPD:  Yes


Cerebrovascular Accident:  No


Diabetes:  No


Diminished Hearing:  No


Endocrine:  No


Gastrointestinal Disorders:  Yes (abdominal pain)


GERD:  Yes


Glaucoma:  No


Genitourinary:  No


Headaches:  Yes


Hepatitis:  Yes (HEP C)


Hiatal Hernia:  No


Hypertension:  Yes


Immune Disorder:  No


Kidney Stones:  No


Musculoskeletal:  Yes (chronic back pain)


Neurologic:  No


Psychiatric:  No


Reproductive:  No


Respiratory:  Yes


Immunizations Current:  No


Myocardial Infarction:  No


Pancreatitis:  Yes


Pneumonia:  Yes


Radiation Therapy:  No


Renal Failure:  No


Seizures:  Yes


Sickle Cell Disease:  No


Sleep Apnea:  No


Thyroid Disease:  No


Ulcer:  No


Influenza Vaccination:  No


Menopausal:  Yes





Past Surgical History


Abdominal Surgery:  Yes


AICD:  No


Arteriovenous Shunt:  No


Cardiac Surgery:  No


Ear Surgery:  No


Endocrine Surgery:  No


Eye Surgery:  No


Genitourinary Surgery:  No


Gynecologic Surgery:  Yes (HYSTERECTOMY 2000)


Hysterectomy:  Yes


Insulin Pump:  No


Joint Replacement:  No


Neurologic Surgery:  No


Oral Surgery:  No


Pacemaker:  No


Thoracic Surgery:  No


Tonsillectomy:  Yes


Other Surgery:  Yes (BACK, HAND)





Social History


Alcohol Use:  Yes (ALOT PER PT)


Tobacco Use:  Yes (1 PPD)


Substance Use:  Yes ( reports use of coacaine in 2010; MARIJUANA CURRENTLY)





Allergies-Medications


(Allergen,Severity, Reaction):  


Coded Allergies:  


     clindamycin (Unverified  Allergy, Severe, UNKNOWN REACTION, 9/2/17)


     hydroxyzine (Unverified  Allergy, Severe, "HEAD SPINS", 9/2/17)


     levofloxacin (Unverified  Allergy, Severe, UNKNOWN REACTION, 9/2/17)


     penicillin G (Unverified  Allergy, Severe, BLISTERS, 9/2/17)


     codeine (Unverified  Adverse Reaction, Severe, "EYE BLIND", 9/2/17)


 DIPLOPIA AND DIZZINESS


Reported Meds & Prescriptions





Reported Meds & Active Scripts


Active


Protonix (Pantoprazole Sodium) 40 Mg Tab 40 Mg PO DAILY


Reported


Lisinopril 20 Mg Tab 20 Mg PO DAILY








Review of Systems


Except as stated in HPI:  all other systems reviewed are Neg


General / Constitutional:  No: Fever, Chills


Cardiovascular:  Positive: Chest Pain or Discomfort


Respiratory:  Positive: Cough, No: Shortness of Breath


Gastrointestinal:  Positive: Nausea, Vomiting, Abdominal Pain, No: Diarrhea


Genitourinary:  No: Urgency, Frequency, Dysuria


Neurologic:  Positive: Tremor


Psychiatric:  Positive: Other (she states that she has not used alcohol since 

her recent discharge)





Physical Exam


Narrative


GENERAL: Patient is shaking uncontrollably.  She was immediately asking for 

pain medications.


SKIN:  warm/dry.


HEAD: Normocephalic. 


EYES: Pupils equal and round. No scleral icterus. No injection or drainage. 


ENT: No nasal bleeding or discharge.  Mucous membranes pink and moist.


NECK: Trachea midline.  Full range of motion without pain.. 


CARDIOVASCULAR: Regular rhythm.  Rapid rate at about 120.  Heart sounds are 

normal.


RESPIRATORY: No accessory muscle use. Clear to auscultation. Breath sounds 

equal bilaterally. 


GASTROINTESTINAL: Abdomen soft.  Nontender.  Bowel sounds present.  

Nondistended.  No caput [place] noted.  No ascites.


MUSCULOSKELETAL: No obvious deformities. 


NEUROLOGICAL: Awake and alert. No obvious cranial nerve deficits.  Motor 

grossly within normal limits. Normal speech.


PSYCHIATRIC: Appropriate mood and affect; insight and judgment normal.





Data


Data


Last Documented VS





Vital Signs








  Date Time  Temp Pulse Resp B/P (MAP) Pulse Ox O2 Delivery O2 Flow Rate FiO2


 


9/2/17 08:56  112 16 193/105 (134) 98 Room Air  


 


9/2/17 08:44 97.5       








Orders





 Orders


Complete Blood Count With Diff (9/2/17 09:02)


Comprehensive Metabolic Panel (9/2/17 09:02)


Lipase (9/2/17 09:02)


Prothrombin Time / Inr (Pt) (9/2/17 09:02)


Act Partial Throm Time (Ptt) (9/2/17 09:02)


Abdomen, Flat & Upright (9/2/17 )


Iv Access Insert/Monitor (9/2/17 09:02)


Morphine Inj (Morphine Inj) (9/2/17 09:15)


Ondansetron Inj (Zofran Inj) (9/2/17 09:15)


Sodium Chlor 0.9% 1000 Ml Inj (Ns 1000 M (9/2/17 09:02)


Sodium Chloride 0.9% Flush (Ns Flush) (9/2/17 09:15)


Electrocardiogram (9/2/17 09:02)


Chest, Single Ap (9/2/17 09:02)


Magnesium (Mg) (9/2/17 09:12)


Famotidine Inj (Pepcid Inj) (9/2/17 09:15)


Lorazepam Inj (Ativan Inj) (9/2/17 09:15)





Labs





Laboratory Tests








Test


  9/2/17


08:18


 


White Blood Count 9.6 TH/MM3 


 


Red Blood Count 4.21 MIL/MM3 


 


Hemoglobin 13.8 GM/DL 


 


Hematocrit 39.0 % 


 


Mean Corpuscular Volume 92.7 FL 


 


Mean Corpuscular Hemoglobin 32.8 PG 


 


Mean Corpuscular Hemoglobin


Concent 35.4 % 


 


 


Red Cell Distribution Width 12.9 % 


 


Platelet Count 288 TH/MM3 


 


Mean Platelet Volume 8.4 FL 


 


Neutrophils (%) (Auto) 83.9 % 


 


Lymphocytes (%) (Auto) 7.0 % 


 


Monocytes (%) (Auto) 8.2 % 


 


Eosinophils (%) (Auto) 0.6 % 


 


Basophils (%) (Auto) 0.3 % 


 


Neutrophils # (Auto) 8.0 TH/MM3 


 


Lymphocytes # (Auto) 0.7 TH/MM3 


 


Monocytes # (Auto) 0.8 TH/MM3 


 


Eosinophils # (Auto) 0.1 TH/MM3 


 


Basophils # (Auto) 0.0 TH/MM3 


 


CBC Comment DIFF FINAL 


 


Differential Comment  


 


Prothrombin Time 12.5 SEC 


 


Prothromb Time International


Ratio 1.1 RATIO 


 


 


Activated Partial


Thromboplast Time 29.4 SEC 


 


 


Blood Urea Nitrogen 4 MG/DL 


 


Creatinine 0.81 MG/DL 


 


Random Glucose 131 MG/DL 


 


Total Protein 8.6 GM/DL 


 


Albumin 4.1 GM/DL 


 


Calcium Level 9.6 MG/DL 


 


Alkaline Phosphatase 77 U/L 


 


Aspartate Amino Transf


(AST/SGOT) 23 U/L 


 


 


Alanine Aminotransferase


(ALT/SGPT) 33 U/L 


 


 


Total Bilirubin 0.3 MG/DL 


 


Sodium Level 129 MEQ/L 


 


Potassium Level 3.6 MEQ/L 


 


Chloride Level 91 MEQ/L 


 


Carbon Dioxide Level 30.5 MEQ/L 


 


Anion Gap 8 MEQ/L 


 


Estimat Glomerular Filtration


Rate 72 ML/MIN 


 


 


Lipase 130 U/L 











MDM


Medical Decision Making


Medical Screen Exam Complete:  Yes


Emergency Medical Condition:  Yes


Medical Record Reviewed:  Yes (this patient was here on August 27 with 

alcoholic gastritis and hyponatremia.  She was treated with IV fluids, IV 

magnesium, Protonix, Ativan, morphine and Zofran.  She quickly improved and was 

discharged the next day.  Her chronic underlying medical problems include 

chronic alcohol abuse, pancreatitis, hepatitis C, hypertension, peptic ulcer 

disease and bipolar disorder.  This patient is followed by pain management.)


Differential Diagnosis


Differential diagnosis of abdominal pain includes but is not limited to 

gastritis, pancreatitis, hepatitis, gastroenteritis, gallbladder disease, 

constipation, urinary retention, UTI, peptic ulcer disease, diverticulitis or 

appendicitis


Narrative Course


This patient presents with shaking, epigastric pain and one episode of vomiting 

prior to arrival.  I have ordered IV fluids, IV morphine, IV Zofran, IV Ativan.

  Protonix is in short supply.  I have substituted Pepcid.





CBC & BMP Diagram


9/2/17 08:18








Total Protein 8.6 #H, Albumin 4.1, Calcium Level 9.6, Alkaline Phosphatase 77, 

Aspartate Amino Transf (AST/SGOT) 23, Alanine Aminotransferase (ALT/SGPT) 33, 

Total Bilirubin 0.3





Coags are normal.





This patient's labs are improved from previous.  She now appears to be sleeping 

in no distress.





The history, exam, diagnostic testing, and current condition do not suggest any 

significant pathology to warrant further testing, continued ED treatment, 

admission, or surgical evaluation at this point.  The patient's condition is 

stable and appropriate for discharge.





Diagnosis





 Primary Impression:  


 Abdominal pain, epigastric


 Additional Impression:  


 Shaking


Disposition:  01 DISCHARGE HOME


Condition:  Stable











Elizabeth Worrell MD Sep 2, 2017 09:21

## 2017-09-02 NOTE — RADRPT
EXAM DATE/TIME:  09/02/2017 09:46 

 

[facility] COMPARISON:     

CHEST SINGLE AP, December 02, 2015, 3:13.

 

                     

INDICATIONS :     

Upper abdominal pain with vomiting for one day.

                     

 

MEDICAL HISTORY :            

Chronic obstructive pulmonary disease. Cardiovascular disease Hypertension.pancreatitis   

 

SURGICAL HISTORY :     

Hysterectomy.   

 

ENCOUNTER:     

Initial                                        

 

ACUITY:     

1 day      

 

PAIN SCORE:     

6/10

 

LOCATION:     

Bilateral  epigastric 

 

FINDINGS:     

A single view of the chest demonstrates the lungs to be symmetrically aerated without evidence of mas
s, infiltrate or effusion.  The cardiomediastinal contours are unremarkable.  Osseous structures are 
intact.

 

CONCLUSION:     Normal examination.  

 

 

 

 Chico Hawkins MD on September 02, 2017 at 10:04           

Board Certified Radiologist.

 This report was verified electronically.

## 2017-09-02 NOTE — EKG
Date Performed: 09/02/2017       Time Performed: 09:29:43

 

PTAGE:      57 years

 

EKG:      Sinus rhythm 

 

 NORMAL ECG

 

PREVIOUS TRACING       : 12/17/2015 02.49

 

DOCTOR:   Rick Guthrie  Interpretating Date/Time  09/02/2017 17:47:05

## 2017-09-02 NOTE — RADRPT
EXAM DATE/TIME:  09/02/2017 09:46 

 

[facility] COMPARISON:     

No previous studies available for comparison.

 

                     

INDICATIONS :     

Abdominal pain with vomiting.

                     

 

MEDICAL HISTORY :            

Chronic obstructive pulmonary disease. Cardiovascular disease Hypertension.pancreatitis   

 

SURGICAL HISTORY :     

Hysterectomy.   

 

ENCOUNTER:     

Initial                                        

 

ACUITY:     

1 day      

 

PAIN SCORE:     

7/10

 

LOCATION:     

Bilateral  abdominal area

 

FINDINGS:     

Supine and upright views of the abdomen were performed.  The abdominal bowel gas pattern is normal.  
No air fluid levels are seen.  No abnormal masses, calcifications, or organomegaly is seen.  The visu
alized lower lungs are clear.  No evidence of free intraperitoneal gas.  The osseous structures are u
nremarkable.

 

CONCLUSION:     Normal examination.  

 

 

 

 Chico Hawkins MD on September 02, 2017 at 10:04           

Board Certified Radiologist.

 This report was verified electronically.

## 2017-09-16 ENCOUNTER — HOSPITAL ENCOUNTER (OUTPATIENT)
Dept: HOSPITAL 17 - NEPC | Age: 57
Setting detail: OBSERVATION
LOS: 3 days | Discharge: HOME | End: 2017-09-19
Attending: HOSPITALIST | Admitting: HOSPITALIST
Payer: COMMERCIAL

## 2017-09-16 VITALS
RESPIRATION RATE: 16 BRPM | SYSTOLIC BLOOD PRESSURE: 162 MMHG | DIASTOLIC BLOOD PRESSURE: 91 MMHG | OXYGEN SATURATION: 96 % | TEMPERATURE: 98 F | HEART RATE: 71 BPM

## 2017-09-16 VITALS
HEART RATE: 99 BPM | OXYGEN SATURATION: 98 % | DIASTOLIC BLOOD PRESSURE: 97 MMHG | RESPIRATION RATE: 15 BRPM | SYSTOLIC BLOOD PRESSURE: 194 MMHG | TEMPERATURE: 97.9 F

## 2017-09-16 VITALS
HEART RATE: 66 BPM | RESPIRATION RATE: 18 BRPM | SYSTOLIC BLOOD PRESSURE: 158 MMHG | OXYGEN SATURATION: 94 % | DIASTOLIC BLOOD PRESSURE: 87 MMHG | TEMPERATURE: 98.2 F

## 2017-09-16 VITALS
SYSTOLIC BLOOD PRESSURE: 155 MMHG | HEART RATE: 81 BPM | OXYGEN SATURATION: 94 % | RESPIRATION RATE: 16 BRPM | DIASTOLIC BLOOD PRESSURE: 84 MMHG

## 2017-09-16 VITALS
OXYGEN SATURATION: 94 % | SYSTOLIC BLOOD PRESSURE: 173 MMHG | RESPIRATION RATE: 19 BRPM | DIASTOLIC BLOOD PRESSURE: 91 MMHG | HEART RATE: 80 BPM

## 2017-09-16 VITALS
DIASTOLIC BLOOD PRESSURE: 90 MMHG | HEART RATE: 78 BPM | OXYGEN SATURATION: 92 % | RESPIRATION RATE: 16 BRPM | SYSTOLIC BLOOD PRESSURE: 160 MMHG

## 2017-09-16 VITALS — WEIGHT: 143.3 LBS | BODY MASS INDEX: 23.88 KG/M2 | HEIGHT: 65 IN

## 2017-09-16 VITALS
HEART RATE: 84 BPM | DIASTOLIC BLOOD PRESSURE: 103 MMHG | RESPIRATION RATE: 16 BRPM | SYSTOLIC BLOOD PRESSURE: 196 MMHG | OXYGEN SATURATION: 92 %

## 2017-09-16 DIAGNOSIS — J44.9: ICD-10-CM

## 2017-09-16 DIAGNOSIS — E87.6: ICD-10-CM

## 2017-09-16 DIAGNOSIS — K44.9: ICD-10-CM

## 2017-09-16 DIAGNOSIS — Z23: ICD-10-CM

## 2017-09-16 DIAGNOSIS — K29.20: Primary | ICD-10-CM

## 2017-09-16 DIAGNOSIS — F10.239: ICD-10-CM

## 2017-09-16 DIAGNOSIS — E86.0: ICD-10-CM

## 2017-09-16 DIAGNOSIS — R11.2: ICD-10-CM

## 2017-09-16 DIAGNOSIS — B19.20: ICD-10-CM

## 2017-09-16 DIAGNOSIS — D72.829: ICD-10-CM

## 2017-09-16 DIAGNOSIS — E87.1: ICD-10-CM

## 2017-09-16 DIAGNOSIS — K22.10: ICD-10-CM

## 2017-09-16 DIAGNOSIS — Z87.11: ICD-10-CM

## 2017-09-16 DIAGNOSIS — K21.0: ICD-10-CM

## 2017-09-16 DIAGNOSIS — F17.200: ICD-10-CM

## 2017-09-16 DIAGNOSIS — I10: ICD-10-CM

## 2017-09-16 LAB
ALP SERPL-CCNC: 77 U/L (ref 45–117)
ALT SERPL-CCNC: 22 U/L (ref 10–53)
ANION GAP SERPL CALC-SCNC: 14 MEQ/L (ref 5–15)
AST SERPL-CCNC: 21 U/L (ref 15–37)
BASOPHILS # BLD AUTO: 0 TH/MM3 (ref 0–0.2)
BASOPHILS NFR BLD: 0.4 % (ref 0–2)
BILIRUB SERPL-MCNC: 0.5 MG/DL (ref 0.2–1)
BUN SERPL-MCNC: 15 MG/DL (ref 7–18)
CHLORIDE SERPL-SCNC: 85 MEQ/L (ref 98–107)
CK SERPL-CCNC: 52 U/L (ref 26–192)
COLOR UR: YELLOW
COMMENT (UR): (no result)
CULTURE IF INDICATED: (no result)
EOSINOPHIL # BLD: 0.1 TH/MM3 (ref 0–0.4)
EOSINOPHIL NFR BLD: 0.5 % (ref 0–4)
ERYTHROCYTE [DISTWIDTH] IN BLOOD BY AUTOMATED COUNT: 12.9 % (ref 11.6–17.2)
GFR SERPLBLD BASED ON 1.73 SQ M-ARVRAT: 72 ML/MIN (ref 89–?)
GLUCOSE UR STRIP-MCNC: (no result) MG/DL
HCO3 BLD-SCNC: 28.5 MEQ/L (ref 21–32)
HCT VFR BLD CALC: 36.7 % (ref 35–46)
HEMO FLAGS: (no result)
HGB UR QL STRIP: (no result)
KETONES UR STRIP-MCNC: 10 MG/DL
LYMPHOCYTES # BLD AUTO: 1.7 TH/MM3 (ref 1–4.8)
LYMPHOCYTES NFR BLD AUTO: 13.9 % (ref 9–44)
MCH RBC QN AUTO: 31.8 PG (ref 27–34)
MCHC RBC AUTO-ENTMCNC: 34.7 % (ref 32–36)
MCV RBC AUTO: 91.7 FL (ref 80–100)
MONOCYTES NFR BLD: 7.2 % (ref 0–8)
MUCOUS THREADS #/AREA URNS LPF: (no result) /LPF
NEUTROPHILS # BLD AUTO: 9.5 TH/MM3 (ref 1.8–7.7)
NEUTROPHILS NFR BLD AUTO: 78 % (ref 16–70)
NITRITE UR QL STRIP: (no result)
PLATELET # BLD: 340 TH/MM3 (ref 150–450)
POTASSIUM SERPL-SCNC: 3.1 MEQ/L (ref 3.5–5.1)
RBC # BLD AUTO: 4.01 MIL/MM3 (ref 4–5.3)
SODIUM SERPL-SCNC: 127 MEQ/L (ref 136–145)
SP GR UR STRIP: 1.01 (ref 1–1.03)
SQUAMOUS #/AREA URNS HPF: 3 /HPF (ref 0–5)
WBC # BLD AUTO: 12.2 TH/MM3 (ref 4–11)

## 2017-09-16 PROCEDURE — 83690 ASSAY OF LIPASE: CPT

## 2017-09-16 PROCEDURE — 96365 THER/PROPH/DIAG IV INF INIT: CPT

## 2017-09-16 PROCEDURE — C9113 INJ PANTOPRAZOLE SODIUM, VIA: HCPCS

## 2017-09-16 PROCEDURE — 80048 BASIC METABOLIC PNL TOTAL CA: CPT

## 2017-09-16 PROCEDURE — 81001 URINALYSIS AUTO W/SCOPE: CPT

## 2017-09-16 PROCEDURE — 88312 SPECIAL STAINS GROUP 1: CPT

## 2017-09-16 PROCEDURE — 00740: CPT

## 2017-09-16 PROCEDURE — 84484 ASSAY OF TROPONIN QUANT: CPT

## 2017-09-16 PROCEDURE — 71010: CPT

## 2017-09-16 PROCEDURE — 96376 TX/PRO/DX INJ SAME DRUG ADON: CPT

## 2017-09-16 PROCEDURE — 88305 TISSUE EXAM BY PATHOLOGIST: CPT

## 2017-09-16 PROCEDURE — 99285 EMERGENCY DEPT VISIT HI MDM: CPT

## 2017-09-16 PROCEDURE — 96375 TX/PRO/DX INJ NEW DRUG ADDON: CPT

## 2017-09-16 PROCEDURE — 83735 ASSAY OF MAGNESIUM: CPT

## 2017-09-16 PROCEDURE — 90471 IMMUNIZATION ADMIN: CPT

## 2017-09-16 PROCEDURE — 80053 COMPREHEN METABOLIC PANEL: CPT

## 2017-09-16 PROCEDURE — 90686 IIV4 VACC NO PRSV 0.5 ML IM: CPT

## 2017-09-16 PROCEDURE — 43239 EGD BIOPSY SINGLE/MULTIPLE: CPT

## 2017-09-16 PROCEDURE — 82948 REAGENT STRIP/BLOOD GLUCOSE: CPT

## 2017-09-16 PROCEDURE — 82550 ASSAY OF CK (CPK): CPT

## 2017-09-16 PROCEDURE — 96366 THER/PROPH/DIAG IV INF ADDON: CPT

## 2017-09-16 PROCEDURE — 84132 ASSAY OF SERUM POTASSIUM: CPT

## 2017-09-16 PROCEDURE — 85025 COMPLETE CBC W/AUTO DIFF WBC: CPT

## 2017-09-16 PROCEDURE — G0008 ADMIN INFLUENZA VIRUS VAC: HCPCS

## 2017-09-16 PROCEDURE — G0378 HOSPITAL OBSERVATION PER HR: HCPCS

## 2017-09-16 PROCEDURE — 96361 HYDRATE IV INFUSION ADD-ON: CPT

## 2017-09-16 PROCEDURE — 93005 ELECTROCARDIOGRAM TRACING: CPT

## 2017-09-16 RX ADMIN — STANDARDIZED SENNA CONCENTRATE AND DOCUSATE SODIUM SCH TAB: 8.6; 5 TABLET, FILM COATED ORAL at 22:01

## 2017-09-16 RX ADMIN — Medication SCH ML: at 21:00

## 2017-09-16 RX ADMIN — MORPHINE SULFATE PRN MG: 2 INJECTION, SOLUTION INTRAMUSCULAR; INTRAVENOUS at 22:02

## 2017-09-16 NOTE — HHI.HP
__________________________________________________





[facility]


Service


[facility]


Primary Care Physician


Sami Stephens M.D.


Admission Diagnosis





Diagnoses:  


(1) Intractable nausea and vomiting


Diagnosis:  Principal





(2) Alcohol abuse


Diagnosis:  Principal





(3) Hyponatremia


Diagnosis:  Principal





(4) Hypokalemia


Diagnosis:  Principal





(5) HTN (hypertension)


Diagnosis:  Principal





(6) Leukocytosis


Diagnosis:  Principal





(7) Tobacco abuse


Diagnosis:  Principal





Travel History


International Travel<30 Days:  No


Contact w/Intl Traveler <30 Da:  No


Traveled to Known Affected Are:  No


History of Present Illness


This is a 57-year-old female with a PMH of Anxiety, Depression, Bipolar Disorder

, Alcohol Abuse, Hepatitis C, Tobacco Abuse and Cocaine Abuse presented to the 

ER with complaints of epigastric abdominal pain in addition to nausea/vomiting 

x2 days.  Denies fever, chills or diarrhea.  No hematemesis reported.  On 

arrival, /97, HR 99, O2 sat 98% on RA, Afebrile.  WBC 12.2.  Na 127, K+ 

3.1.  GFR 72.  Trop negative.  LFTs normal.  Lipase 194.  INR 1.1.  U/a 

negative.  CXR w/ no acute findings.  S/p Protonix and Morphine in ER w/ some 

improvement.





Review of Systems


Except as stated in HPI:  all other systems reviewed are Neg


ROS: 14 point review of systems otherwise negative.





Past Family Social History


Past Medical History


PMH:  Anxiety, Depression, Bipolar Disorder, Alcohol Abuse, Hepatitis C, 

Tobacco Abuse and Cocaine Abuse


Past Surgical History


PAST SURGICAL HISTORY:  Hysterectomy, Tonsillectomy, Back Surgery, Hand Surgery


Allergies:  


Coded Allergies:  


     clindamycin (Unverified  Allergy, Severe, UNKNOWN REACTION, 17)


     hydroxyzine (Unverified  Allergy, Severe, "HEAD SPINS", 17)


     levofloxacin (Unverified  Allergy, Severe, UNKNOWN REACTION, 17)


     penicillin G (Unverified  Allergy, Severe, BLISTERS, 17)


     codeine (Unverified  Adverse Reaction, Severe, "EYE BLIND", 17)


 DIPLOPIA AND DIZZINESS


Family History


PAST FAMILY HISTORY:  Reviewed.  No h/o DM or CAD


Social History


PAST SOCIAL HISTORY:  Positive for Alcohol Abuse.  Smokes 1ppd.  +Cocaine, +

Marijuana.





Physical Exam


Vital Signs





Vital Signs








  Date Time  Temp Pulse Resp B/P (MAP) Pulse Ox O2 Delivery O2 Flow Rate FiO2


 


17 19:08  81 16 155/84 (107) 94 Room Air  


 


17 18:00  78 16 160/90 (113) 92 Room Air  


 


17 17:06  80 19 173/91 (118) 94 Room Air  


 


17 16:01  84 16 196/103 (134) 92 Room Air  


 


17 15:53 97.9 99 15 194/97 (129) 98   








Physical Exam


PE:


GENERAL: Middle-aged female in no acute distress.


HEENT: PERRLA, EOMI. No scleral icterus or conjunctival pallor. No lid lag or 

facial droop.  


CARDIOVASCULAR: Regular rate and rhythm.  No obvious murmurs to auscultation. 

No chest tenderness to palpation. 


RESPIRATORY: No obvious rhonchi or wheezing. Clear to auscultation. Breath 

sounds equal bilaterally. 


GASTROINTESTINAL: Abdomen soft, mild epigastric tenderness to palpation, 

nondistended. BS normal. 


MUSCULOSKELETAL: Extremities without clubbing, cyanosis, or edema. No obvious 

deformities. 


NEUROLOGICAL: Awake, alert and oriented x4. No focal neurologic deficits. 

Moving both upper and lower extremities spontaneously.


Laboratory





Laboratory Tests








Test


  17


16:20 17


17:00


 


White Blood Count 12.2  


 


Red Blood Count 4.01  


 


Hemoglobin 12.7  


 


Hematocrit 36.7  


 


Mean Corpuscular Volume 91.7  


 


Mean Corpuscular Hemoglobin 31.8  


 


Mean Corpuscular Hemoglobin


Concent 34.7 


  


 


 


Red Cell Distribution Width 12.9  


 


Platelet Count 340  


 


Mean Platelet Volume 8.0  


 


Neutrophils (%) (Auto) 78.0  


 


Lymphocytes (%) (Auto) 13.9  


 


Monocytes (%) (Auto) 7.2  


 


Eosinophils (%) (Auto) 0.5  


 


Basophils (%) (Auto) 0.4  


 


Neutrophils # (Auto) 9.5  


 


Lymphocytes # (Auto) 1.7  


 


Monocytes # (Auto) 0.9  


 


Eosinophils # (Auto) 0.1  


 


Basophils # (Auto) 0.0  


 


CBC Comment DIFF FINAL  


 


Differential Comment   


 


Blood Urea Nitrogen 15  


 


Creatinine 0.81  


 


Random Glucose 111  


 


Total Protein 8.8  


 


Albumin 4.2  


 


Calcium Level 9.9  


 


Alkaline Phosphatase 77  


 


Aspartate Amino Transf


(AST/SGOT) 21 


  


 


 


Alanine Aminotransferase


(ALT/SGPT) 22 


  


 


 


Total Bilirubin 0.5  


 


Sodium Level 127  


 


Potassium Level 3.1  


 


Chloride Level 85  


 


Carbon Dioxide Level 28.5  


 


Anion Gap 14  


 


Estimat Glomerular Filtration


Rate 72 


  


 


 


Total Creatine Kinase 52  


 


Troponin I LESS THAN 0.02  


 


Lipase 194  


 


Urine Color  YELLOW 


 


Urine Turbidity  CLEAR 


 


Urine pH  7.0 


 


Urine Specific Gravity  1.014 


 


Urine Protein  TRACE 


 


Urine Glucose (UA)  NEG 


 


Urine Ketones  10 


 


Urine Occult Blood  NEG 


 


Urine Nitrite  NEG 


 


Urine Bilirubin  NEG 


 


Urine Urobilinogen  LESS THAN 2.0 


 


Urine Leukocyte Esterase  MOD 


 


Urine RBC  1 


 


Urine WBC  3 


 


Urine Squamous Epithelial


Cells 


  3 


 


 


Urine Mucus  FEW 


 


Microscopic Urinalysis Comment


  


  CULT NOT


INDICATED








Result Diagram:  


17 [id number] 162








Caprini VTE Risk Assessment


Caprini VTE Risk Assessment:  No/Low Risk (score <= 1)


Caprini Risk Assessment Model











 Point Value = 1          Point Value = 2  Point Value = 3  Point Value = 5


 


Age 41-60


Minor surgery


BMI > 25 kg/m2


Swollen legs


Varicose veins


Pregnancy or postpartum


History of unexplained or recurrent


   spontaneous 


Oral contraceptives or hormone


   replacement


Sepsis (< 1 month)


Serious lung disease, including


   pneumonia (< 1 month)


Abnormal pulmonary function


Acute myocardial infarction


Congestive heart failure (< 1 month)


History of inflammatory bowel disease


Medical patient at bed rest Age 61-74


Arthroscopic surgery


Major open surgery (> 45 min)


Laparoscopic surgery (> 45 min)


Malignancy


Confined to bed (> 72 hours)


Immobilizing plaster cast


Central venous access Age >= 75


History of VTE


Family history of VTE


Factor V Leiden


Prothrombin 09950T


Lupus anticoagulant


Anticardiolipin antibodies


Elevated serum homocysteine


Heparin-induced thrombocytopenia


Other congenital or acquired


   thrombophilia Stroke (< 1 month)


Elective arthroplasty


Hip, pelvis, or leg fracture


Acute spinal cord injury (< 1 month)








Prophylaxis Regimen











   Total Risk


Factor Score Risk Level Prophylaxis Regimen


 


0-1      Low Early ambulation


 


2 Moderate Order ONE of the following:


*Sequential Compression Device (SCD)


*Heparin 5000 units SQ BID


 


3-4 Higher Order ONE of the following medications:


*Heparin 5000 units SQ TID


*Enoxaparin/Lovenox 40 mg SQ daily (WT < 150 kg, CrCl > 30 mL/min)


*Enoxaparin/Lovenox 30 mg SQ daily (WT < 150 kg, CrCl > 10-29 mL/min)


*Enoxaparin/Lovenox 30 mg SQ BID (WT < 150 kg, CrCl > 30 mL/min)


AND/OR


*Sequential Compression Device (SCD)


 


5 or more Highest Order ONE of the following medications:


*Heparin 5000 units SQ TID (Preferred with Epidurals)


*Enoxaparin/Lovenox 40 mg SQ daily (WT < 150 kg, CrCl > 30 mL/min)


*Enoxaparin/Lovenox 30 mg SQ daily (WT < 150 kg, CrCl > 10-29 mL/min)


*Enoxaparin/Lovenox 30 mg SQ BID (WT < 150 kg, CrCl > 30 mL/min)


AND


*Sequential Compression Device (SCD)











Assessment and Plan


Problem List:  


(1) Intractable nausea and vomiting


ICD Code:  R11.2 - Nausea with vomiting, unspecified


(2) Alcohol abuse


ICD Code:  F10.10 - Alcohol abuse, uncomplicated


(3) Hyponatremia


ICD Code:  E87.1 - Hyponatremia


Status:  Acute


(4) Hypokalemia


ICD Code:  E87.6 - Hypokalemia


Status:  Acute


(5) Leukocytosis


ICD Code:  D72.829 - Elevated white blood cell count, unspecified


Status:  Acute


(6) HTN (hypertension)


ICD Code:  I10 - HTN (hypertension)


Status:  Acute


(7) Tobacco abuse


ICD Code:  Z72.0 - Tobacco use


Assessment and Plan


A/P:


1.  Intractable N/V:  Likely secondary to Alcoholic Gastritis, +Alcohol Abuse, 

no hematemesis.  Protonix IV, analgesics/antiemetics as needed.  S/p Morphine 

in ER w/ some improvement.  Diet as tolerated. 


2.  Alcohol Abuse:  Heavy.  Daily.  High risk for withdrawal, CIWA, Seizure 

Precautions, MVT/Thiamine/Folate replacement. 


3.  Hyponatremia:  Na 127, likely secondary to chronic alcohol abuse and acute 

dehydration.  IVF for hydration, repeat labs in am. 


4.  Hypokalemia:  K+ 3.1, s/p replacement in ER, will recheck and replace as 

needed. 


5.  Leukocytosis:  WBC 12.2, elevated neutrophil count.  Afebrile.  CXR w/ no 

acute findings, images reviewed by me.  No evidence of infection at this time, 

will repeat labs in am. 


6.  HTN:  Uncontrolled.  's on arrival, likely compounded by intractable 

nausea/vomiting.  BP currently 150's systolic.  Will monitor.  Clonidine if 

needed. 


7.  Tobacco Abuse:  Pt counselled.  NicoDerm prn if needed. 


8.  DVT Prophylaxis:  SCD/Teds. 


9.  Social work for d/c planning as needed. 


10.  Case discussed w/ ER physician at length.











Shelley Hooker MD Sep 16, 2017 19:15

## 2017-09-16 NOTE — PD
HPI


Chief Complaint:  GI Complaint


Time Seen by Provider:  15:58


Travel History


International Travel<30 days:  No


Contact w/Intl Traveler<30days:  No


Traveled to known affect area:  No





History of Present Illness


HPI


This is a 57-year-old female with a history of EtOH abuse, peptic ulcer disease

, polysubstance abuse including cocaine, who presents today with complaints of 

epigastric pain with associated nausea.  Patient denies any vomiting or 

diarrhea.  Patient states that she is out of her Protonix.  She reports that 2 

days ago because of a death in the family, she started drinking alcohol.  She 

states that shortly thereafter she started expansion epigastric pain.  Patient 

denies any chest pain, chest pressure.  The patient denies any other symptoms 

at this time.





PFSH


Past Medical History


Anemia:  Yes


Arthritis:  Yes


Asthma:  No


Autoimmune Disease:  No


Blood Disorders:  No


Bipolar Disorder:  Yes


Anxiety:  Yes


Depression:  Yes


Heart Rhythm Problems:  No


Cancer:  No


Cardiovascular Problems:  No


High Cholesterol:  No


Chemotherapy:  No


Chest Pain:  No


Congestive Heart Failure:  No


COPD:  Yes


Cerebrovascular Accident:  No


Diabetes:  No


Diminished Hearing:  No


Endocrine:  No


Gastrointestinal Disorders:  Yes (abdominal pain)


GERD:  Yes


Glaucoma:  No


Genitourinary:  No


Headaches:  Yes


Hepatitis:  Yes (HEP C)


Hiatal Hernia:  No


Hypertension:  Yes


Immune Disorder:  No


Kidney Stones:  No


Musculoskeletal:  Yes (chronic back pain)


Neurologic:  No


Psychiatric:  No


Reproductive:  No


Respiratory:  Yes


Immunizations Current:  No


Myocardial Infarction:  No


Pancreatitis:  Yes


Pneumonia:  Yes


Radiation Therapy:  No


Renal Failure:  No


Seizures:  Yes


Sickle Cell Disease:  No


Sleep Apnea:  No


Thyroid Disease:  No


Ulcer:  No


Tetanus Vaccination:  < 5 Years


Influenza Vaccination:  No


Pregnant?:  Not Pregnant


Menopausal:  Yes





Past Surgical History


Abdominal Surgery:  Yes


AICD:  No


Arteriovenous Shunt:  No


Cardiac Surgery:  No


Ear Surgery:  No


Endocrine Surgery:  No


Eye Surgery:  No


Genitourinary Surgery:  No


Gynecologic Surgery:  Yes (HYSTERECTOMY 2000)


Hysterectomy:  Yes


Insulin Pump:  No


Joint Replacement:  No


Neurologic Surgery:  No


Oral Surgery:  No


Pacemaker:  No


Thoracic Surgery:  No


Tonsillectomy:  Yes


Other Surgery:  Yes (BACK, HAND)





Social History


Alcohol Use:  Yes (etoh abuse )


Tobacco Use:  Yes (1 PPD)


Substance Use:  Yes ( reports use of coacaine in 2010; MARIJUANA CURRENTLY)





Allergies-Medications


(Allergen,Severity, Reaction):  


Coded Allergies:  


     clindamycin (Unverified  Allergy, Severe, UNKNOWN REACTION, 9/16/17)


     hydroxyzine (Unverified  Allergy, Severe, "HEAD SPINS", 9/16/17)


     levofloxacin (Unverified  Allergy, Severe, UNKNOWN REACTION, 9/16/17)


     penicillin G (Unverified  Allergy, Severe, BLISTERS, 9/16/17)


     codeine (Unverified  Adverse Reaction, Severe, "EYE BLIND", 9/16/17)


 DIPLOPIA AND DIZZINESS


Reported Meds & Prescriptions





Reported Meds & Active Scripts


Active


Chlordiazepoxide HCl 25 Mg Capsule 25 Mg PO TID 5 Days


Protonix (Pantoprazole Sodium) 40 Mg Tab 40 Mg PO DAILY


Protonix (Pantoprazole Sodium) 40 Mg Tab 40 Mg PO DAILY


Reported


Lisinopril 20 Mg Tab 20 Mg PO DAILY








Review of Systems


Except as stated in HPI:  all other systems reviewed are Neg


General / Constitutional:  No: Fever, Chills


HENT:  No: Headaches, Neck Pain


Cardiovascular:  No: Chest Pain or Discomfort, Palpitations, Irregular Rhythm


Respiratory:  No: Cough, Shortness of Breath


Gastrointestinal:  Positive: Nausea, Abdominal Pain (epigastric), No: Vomiting, 

Hematemesis, Hematochezia


Genitourinary:  No: Dysuria, Incontinence, Discharge


Musculoskeletal:  No: Weakness, Pain


Neurologic:  No: Weakness, Dizziness, Headache


Psychiatric:  Positive: Substance Abuse, No: Depression





Physical Exam


Narrative


GENERAL: Well developed well-nourished female in no acute respiratory distress.


SKIN: Focused skin assessment warm/dry.


HEAD: Atraumatic. Normocephalic. 


EYES: No scleral icterus. No injection or drainage. 


ENT: No nasal bleeding or discharge.  Mucous membranes pink and moist.


NECK: Trachea midline.  Supple


CARDIOVASCULAR: Regular rate and rhythm.  No murmur appreciated.


RESPIRATORY: No accessory muscle use. Clear to auscultation. Breath sounds 

equal bilaterally. 


GASTROINTESTINAL: Abdomen soft, nondistended.  Patient has subjective 

tenderness in her epigastrium.  There is no rebound or guarding.


MUSCULOSKELETAL: No obvious deformities. No clubbing.  No cyanosis.  No edema. 


NEUROLOGICAL: Awake and alert. No obvious cranial nerve deficits.  Motor 

grossly within normal limits. Normal speech.





Data


Data


Last Documented VS





Vital Signs








  Date Time  Temp Pulse Resp B/P (MAP) Pulse Ox O2 Delivery O2 Flow Rate FiO2


 


9/16/17 19:08  81 16 155/84 (107) 94 Room Air  


 


9/16/17 15:53 97.9       








Orders





 Orders


Electrocardiogram (9/16/17 16:08)


Complete Blood Count With Diff (9/16/17 16:08)


Comprehensive Metabolic Panel (9/16/17 16:08)


Ckmb (Isoenzyme) Profile (9/16/17 16:08)


Troponin I (9/16/17 16:08)


Lipase (9/16/17 16:08)


Urinalysis - C+S If Indicated (9/16/17 16:08)


Chest, Single Ap (9/16/17 16:08)


Iv Access Insert/Monitor (9/16/17 16:08)


Ecg Monitoring (9/16/17 16:08)


Oximetry (9/16/17 16:08)


Morphine Inj (Morphine Inj) (9/16/17 16:15)


Lorazepam Inj (Ativan Inj) (9/16/17 16:15)


Sodium Chlor 0.9% 1000 Ml Inj (Ns 1000 M (9/16/17 16:15)


Pantoprazole Inj (Protonix Inj) (9/16/17 16:15)


Ns + Kcl 40 Meq Inj (Ns + Kcl 40 Meq Inj (9/16/17 19:00)


Morphine Inj (Morphine Inj) (9/16/17 19:00)


Vital Signs (Adult) Q4H (9/16/17 19:07)


Bedside Glucose HERI.CSUGAR (9/16/17 19:07)


Intake + Output HERI.QSHIFT (9/16/17 19:07)


Alcohol Withdrawal Asmt-Ciwa Q4HX18 (9/16/17 19:07)


^ Seizure Precautions (9/16/17 19:07)


Folic Acid (Folate) (9/17/17 09:00)


Thiamine  (Vit B1) (Vitamin B1) (9/17/17 09:00)


Multivitamins-Minerals Therap (Theragran (9/17/17 09:00)


Consult Cm-Etoh Abuse Dc Plan (9/16/17 )


Flumazenil Inj (Romazicon Inj) (9/16/17 19:15)


Lorazepam (Ativan) (9/16/17 19:15)


Lorazepam Inj (Ativan Inj) (9/16/17 19:15)


Lorazepam (Ativan) (9/16/17 19:15)


Lorazepam Inj (Ativan Inj) (9/16/17 19:15)


Haloperidol Inj (Haldol Inj) (9/16/17 19:15)


Pantoprazole Inj (Protonix Inj) (9/17/17 09:00)


Place In Observation (9/16/17 )


Vital Signs (Adult) Q4H (9/16/17 19:07)


Activity Oob Ad Holly (9/16/17 19:07)


Intake + Output HERI.QSHIFT (9/16/17 19:07)


Diet Regular Basic (9/17/17 Breakfast)


Sodium Chlor 0.9% 1000 Ml Inj (Ns 1000 M (9/16/17 19:07)


Sodium Chloride 0.9% Flush (Ns Flush) (9/16/17 19:15)


Sodium Chloride 0.9% Flush (Ns Flush) (9/16/17 21:00)


Ondansetron Inj (Zofran Inj) (9/16/17 19:15)


Comprehensive Metabolic Panel (9/17/17 06:00)


Complete Blood Count With Diff (9/17/17 06:00)


Scd Bilateral/Knee High HERI.BID (9/16/17 19:07)


Stephan Bilateral/Knee High HERI.QSHIFT (9/16/17 19:14)


Acetaminophen (Tylenol) (9/16/17 19:15)


Morphine Inj (Morphine Inj) (9/16/17 19:15)


Morphine Inj (Morphine Inj) (9/16/17 19:15)


Docusate Sodium-Senna (Colleen-Colace) (9/16/17 21:00)


Magnesium Hydroxide Liq (Milk Of Magnesi (9/16/17 19:15)


Sennosides (Senokot) (9/16/17 19:15)


Bisacodyl Supp (Dulcolax Supp) (9/16/17 19:15)


Lactulose Liq (Lactulose Liq) (9/16/17 19:15)


Admit Order (Ed Use Only) (9/16/17 19:21)





Labs





Laboratory Tests








Test


  9/16/17


16:20 9/16/17


17:00


 


White Blood Count 12.2 TH/MM3  


 


Red Blood Count 4.01 MIL/MM3  


 


Hemoglobin 12.7 GM/DL  


 


Hematocrit 36.7 %  


 


Mean Corpuscular Volume 91.7 FL  


 


Mean Corpuscular Hemoglobin 31.8 PG  


 


Mean Corpuscular Hemoglobin


Concent 34.7 % 


  


 


 


Red Cell Distribution Width 12.9 %  


 


Platelet Count 340 TH/MM3  


 


Mean Platelet Volume 8.0 FL  


 


Neutrophils (%) (Auto) 78.0 %  


 


Lymphocytes (%) (Auto) 13.9 %  


 


Monocytes (%) (Auto) 7.2 %  


 


Eosinophils (%) (Auto) 0.5 %  


 


Basophils (%) (Auto) 0.4 %  


 


Neutrophils # (Auto) 9.5 TH/MM3  


 


Lymphocytes # (Auto) 1.7 TH/MM3  


 


Monocytes # (Auto) 0.9 TH/MM3  


 


Eosinophils # (Auto) 0.1 TH/MM3  


 


Basophils # (Auto) 0.0 TH/MM3  


 


CBC Comment DIFF FINAL  


 


Differential Comment   


 


Blood Urea Nitrogen 15 MG/DL  


 


Creatinine 0.81 MG/DL  


 


Random Glucose 111 MG/DL  


 


Total Protein 8.8 GM/DL  


 


Albumin 4.2 GM/DL  


 


Calcium Level 9.9 MG/DL  


 


Alkaline Phosphatase 77 U/L  


 


Aspartate Amino Transf


(AST/SGOT) 21 U/L 


  


 


 


Alanine Aminotransferase


(ALT/SGPT) 22 U/L 


  


 


 


Total Bilirubin 0.5 MG/DL  


 


Sodium Level 127 MEQ/L  


 


Potassium Level 3.1 MEQ/L  


 


Chloride Level 85 MEQ/L  


 


Carbon Dioxide Level 28.5 MEQ/L  


 


Anion Gap 14 MEQ/L  


 


Estimat Glomerular Filtration


Rate 72 ML/MIN 


  


 


 


Total Creatine Kinase 52 U/L  


 


Troponin I


  LESS THAN 0.02


NG/ML 


 


 


Lipase 194 U/L  


 


Urine Color  YELLOW 


 


Urine Turbidity  CLEAR 


 


Urine pH  7.0 


 


Urine Specific Gravity  1.014 


 


Urine Protein  TRACE mg/dL 


 


Urine Glucose (UA)  NEG mg/dL 


 


Urine Ketones  10 mg/dL 


 


Urine Occult Blood  NEG 


 


Urine Nitrite  NEG 


 


Urine Bilirubin  NEG 


 


Urine Urobilinogen


  


  LESS THAN 2.0


MG/DL


 


Urine Leukocyte Esterase  MOD 


 


Urine RBC  1 /hpf 


 


Urine WBC  3 /hpf 


 


Urine Squamous Epithelial


Cells 


  3 /hpf 


 


 


Urine Mucus  FEW /lpf 


 


Microscopic Urinalysis Comment


  


  CULT NOT


INDICATED











MDM


Medical Decision Making


Medical Screen Exam Complete:  Yes


Emergency Medical Condition:  Yes


Differential Diagnosis


Pancreatitis versus peptic ulcer disease versus alcohol withdrawal


Narrative Course


57-year-old female history of alcohol abuse, polysubstance abuse, peptic ulcer 

disease, presents today with complaints of epigastric pain with associated 

nausea.  Patient has had previous admissions and evaluations for the same.  The 

patient has multiple electrolyte abnormalities including hyponatremia, 

hypokalemia.  She has a sodium of 127.  Her potassium was 3.1.  White count is 

slightly elevated at 12.1.  The patient has a very soft abdomen.  I believe a 

lot of her symptoms are secondary to her alcohol abuse.  Lipase is 194.  She'll 

be admitted under observation for sodium replacement and potassium replacement.

  She's been given 2 doses of IV pain medications.  She's also been given 

Ativan for what is likely early alcohol withdrawal.  She was slightly 

tachycardic and hypertensive when she came in.  She states she's been taking 

her blood pressure medicine as prescribed.





Diagnosis





 Primary Impression:  


 Abdominal pain


 Additional Impressions:  


 Hyponatremia


 Hypokalemia


 History of alcoholism


 probable early alcohol withdrawal.


 Hypertension





Admitting Information


Admitting Physician Requests:  Observation


Scripts


Chlordiazepoxide HCl (Chlordiazepoxide HCl) 25 Mg Capsule


25 MG PO TID for 5 Days, #15


   Prov: Cruzito Dillard MD         9/16/17 


Pantoprazole (Protonix) 40 Mg Tab


40 MG PO DAILY for Reflux, #30 TAB 0 Refills


   Prov: Cruzito Dillard MD         9/16/17











Cruzito Dillard MD Sep 16, 2017 17:46

## 2017-09-17 VITALS
OXYGEN SATURATION: 98 % | HEART RATE: 75 BPM | SYSTOLIC BLOOD PRESSURE: 141 MMHG | DIASTOLIC BLOOD PRESSURE: 77 MMHG | TEMPERATURE: 97.8 F | RESPIRATION RATE: 18 BRPM

## 2017-09-17 VITALS
DIASTOLIC BLOOD PRESSURE: 95 MMHG | OXYGEN SATURATION: 95 % | RESPIRATION RATE: 16 BRPM | HEART RATE: 71 BPM | SYSTOLIC BLOOD PRESSURE: 168 MMHG | TEMPERATURE: 98.8 F

## 2017-09-17 VITALS
HEART RATE: 72 BPM | SYSTOLIC BLOOD PRESSURE: 139 MMHG | DIASTOLIC BLOOD PRESSURE: 76 MMHG | TEMPERATURE: 98.1 F | OXYGEN SATURATION: 94 % | RESPIRATION RATE: 17 BRPM

## 2017-09-17 VITALS
SYSTOLIC BLOOD PRESSURE: 156 MMHG | TEMPERATURE: 97.9 F | RESPIRATION RATE: 18 BRPM | OXYGEN SATURATION: 96 % | DIASTOLIC BLOOD PRESSURE: 82 MMHG | HEART RATE: 66 BPM

## 2017-09-17 VITALS
HEART RATE: 71 BPM | TEMPERATURE: 98.4 F | DIASTOLIC BLOOD PRESSURE: 83 MMHG | RESPIRATION RATE: 16 BRPM | OXYGEN SATURATION: 98 % | SYSTOLIC BLOOD PRESSURE: 158 MMHG

## 2017-09-17 LAB
ALP SERPL-CCNC: 60 U/L (ref 45–117)
ALT SERPL-CCNC: 22 U/L (ref 10–53)
ANION GAP SERPL CALC-SCNC: 9 MEQ/L (ref 5–15)
AST SERPL-CCNC: 35 U/L (ref 15–37)
BASOPHILS # BLD AUTO: 0 TH/MM3 (ref 0–0.2)
BASOPHILS NFR BLD: 0.4 % (ref 0–2)
BILIRUB SERPL-MCNC: 0.5 MG/DL (ref 0.2–1)
BUN SERPL-MCNC: 9 MG/DL (ref 7–18)
CHLORIDE SERPL-SCNC: 96 MEQ/L (ref 98–107)
EOSINOPHIL # BLD: 0.1 TH/MM3 (ref 0–0.4)
EOSINOPHIL NFR BLD: 1.2 % (ref 0–4)
ERYTHROCYTE [DISTWIDTH] IN BLOOD BY AUTOMATED COUNT: 12.7 % (ref 11.6–17.2)
GFR SERPLBLD BASED ON 1.73 SQ M-ARVRAT: 100 ML/MIN (ref 89–?)
HCO3 BLD-SCNC: 23.8 MEQ/L (ref 21–32)
HCT VFR BLD CALC: 33.5 % (ref 35–46)
HEMO FLAGS: (no result)
LYMPHOCYTES # BLD AUTO: 1.8 TH/MM3 (ref 1–4.8)
LYMPHOCYTES NFR BLD AUTO: 22.4 % (ref 9–44)
MCH RBC QN AUTO: 31.7 PG (ref 27–34)
MCHC RBC AUTO-ENTMCNC: 33.7 % (ref 32–36)
MCV RBC AUTO: 94.2 FL (ref 80–100)
MONOCYTES NFR BLD: 9.7 % (ref 0–8)
NEUTROPHILS # BLD AUTO: 5.3 TH/MM3 (ref 1.8–7.7)
NEUTROPHILS NFR BLD AUTO: 66.3 % (ref 16–70)
PLATELET # BLD: 240 TH/MM3 (ref 150–450)
POTASSIUM SERPL-SCNC: 3.6 MEQ/L (ref 3.5–5.1)
RBC # BLD AUTO: 3.55 MIL/MM3 (ref 4–5.3)
SODIUM SERPL-SCNC: 129 MEQ/L (ref 136–145)
WBC # BLD AUTO: 8 TH/MM3 (ref 4–11)

## 2017-09-17 RX ADMIN — STANDARDIZED SENNA CONCENTRATE AND DOCUSATE SODIUM SCH TAB: 8.6; 5 TABLET, FILM COATED ORAL at 08:55

## 2017-09-17 RX ADMIN — MORPHINE SULFATE PRN MG: 2 INJECTION, SOLUTION INTRAMUSCULAR; INTRAVENOUS at 13:15

## 2017-09-17 RX ADMIN — MULTIPLE VITAMINS W/ MINERALS TAB SCH TAB: TAB at 08:55

## 2017-09-17 RX ADMIN — PHENYTOIN SODIUM SCH MLS/HR: 50 INJECTION INTRAMUSCULAR; INTRAVENOUS at 14:31

## 2017-09-17 RX ADMIN — LISINOPRIL SCH MG: 20 TABLET ORAL at 08:55

## 2017-09-17 RX ADMIN — Medication SCH ML: at 08:56

## 2017-09-17 RX ADMIN — PHENYTOIN SODIUM SCH MLS/HR: 50 INJECTION INTRAMUSCULAR; INTRAVENOUS at 05:07

## 2017-09-17 RX ADMIN — STANDARDIZED SENNA CONCENTRATE AND DOCUSATE SODIUM SCH TAB: 8.6; 5 TABLET, FILM COATED ORAL at 20:03

## 2017-09-17 RX ADMIN — PANTOPRAZOLE SODIUM SCH MG: 40 INJECTION, POWDER, FOR SOLUTION INTRAVENOUS at 20:04

## 2017-09-17 RX ADMIN — MORPHINE SULFATE PRN MG: 2 INJECTION, SOLUTION INTRAMUSCULAR; INTRAVENOUS at 03:10

## 2017-09-17 RX ADMIN — MORPHINE SULFATE PRN MG: 2 INJECTION, SOLUTION INTRAMUSCULAR; INTRAVENOUS at 06:28

## 2017-09-17 RX ADMIN — PHENYTOIN SODIUM SCH MLS/HR: 50 INJECTION INTRAMUSCULAR; INTRAVENOUS at 03:28

## 2017-09-17 RX ADMIN — PANTOPRAZOLE SODIUM SCH MG: 40 INJECTION, POWDER, FOR SOLUTION INTRAVENOUS at 08:55

## 2017-09-17 RX ADMIN — Medication SCH ML: at 21:00

## 2017-09-17 RX ADMIN — MORPHINE SULFATE PRN MG: 2 INJECTION, SOLUTION INTRAMUSCULAR; INTRAVENOUS at 17:31

## 2017-09-17 RX ADMIN — MORPHINE SULFATE PRN MG: 2 INJECTION, SOLUTION INTRAMUSCULAR; INTRAVENOUS at 20:07

## 2017-09-17 RX ADMIN — Medication SCH MG: at 08:55

## 2017-09-17 RX ADMIN — FOLIC ACID SCH MG: 1 TABLET ORAL at 08:56
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
## 2017-09-17 NOTE — EKG
Date Performed: 09/16/2017       Time Performed: 17:18:15

 

PTAGE:      57 years

 

EKG:      Sinus rhythm 

 

 NORMAL ECG

 

PREVIOUS TRACING       : 09/02/2017 09.29 No significant change from previous tracing noted.

 

DOCTOR:   Owen Gabriel  Interpretating Date/Time  09/17/2017 12:53:56

## 2017-09-17 NOTE — MB
cc:

ASUNCION JOVEL M.D., WILLIE M.D.

****

 

 

DATE OF CONSULTATION:  09/17/2017.

 

REASON FOR CONSULTATION:

Nausea, vomiting, abdominal pain.

 

PATIENT OF:

Dr. Stephens.

 

HISTORY OF PRESENT ILLNESS:

This is a 57-year-old lady who has previous diagnosis of reflux disease and

esophagitis.  She states she did not take her Protonix for a few days, started

drinking alcohol during the stone and this resulted in severe epigastric

discomfort and nausea and vomiting.  She says currently she is not able to

tolerate anything by mouth without getting severe abdominal pain.

A CT of the abdomen, pelvis was unremarkable.

 

Blood work was unremarkable.

 

REVIEW OF SYSTEMS:

Epigastric discomfort, otherwise no symptoms.

 

PAST MEDICAL HISTORY:

1. Anxiety.

2. Depression.

3. Bipolar disorder.

4. Hepatitis C.

 

PAST SURGICAL HISTORY:

1. Hysterectomy.

2. Tonsillectomy.

3. Back surgery.

4. Hand surgery.

5. Endoscopy in April of 2017.

6. Colonoscopy a few years ago.

 

ALLERGIES:

1. CLINDAMYCIN.

2. LEVOFLOXACIN.

3. PENICILLIN.

4. CODEINE.

 

 

FAMILY HISTORY:

Noncontributory.

 

SOCIAL HISTORY:

Positive for alcohol abuse. She is also a smoker. She uses cocaine and

marijuana.

 

PHYSICAL EXAMINATION:

GENERAL:  The physical exam reveals a well-nourished lady in no apparent

distress.

VITAL SIGNS: Stable.

HEAD AND NECK: Anicteric sclerae.

CHEST: Bilateral air entry.

ABDOMEN: Abdomen is soft. Tenderness in the epigastric area. No guarding. No

rigidity.

CNS: Nonfocal.

RECTAL: Deferred at this time.

 

LABS:

Labs reveal normal liver function tests.

 

White cell count 8, hemoglobin 11.3

 

IMPRESSION:

Epigastric discomfort, nausea, vomiting.

 

RECOMMENDATIONS:

1. Continue Protonix 40 milligrams twice daily.

2. The patient advised to stay away from alcohol.

3. Endoscopy is planned for tomorrow.

 

Further recommendations to follow.  Thank you for this referral.

 

 

                              _________________________________

                              MD TRINA Durham/TED

D:  9/17/2017/6:28 PM

T:  9/17/2017/6:43 PM

Visit #:  [id number]

Job #:  [id number]

## 2017-09-18 VITALS
TEMPERATURE: 98.1 F | OXYGEN SATURATION: 97 % | HEART RATE: 72 BPM | SYSTOLIC BLOOD PRESSURE: 181 MMHG | DIASTOLIC BLOOD PRESSURE: 93 MMHG | RESPIRATION RATE: 18 BRPM

## 2017-09-18 VITALS
DIASTOLIC BLOOD PRESSURE: 70 MMHG | HEART RATE: 69 BPM | SYSTOLIC BLOOD PRESSURE: 126 MMHG | OXYGEN SATURATION: 98 % | RESPIRATION RATE: 18 BRPM | TEMPERATURE: 97.8 F

## 2017-09-18 VITALS
DIASTOLIC BLOOD PRESSURE: 89 MMHG | TEMPERATURE: 97.9 F | SYSTOLIC BLOOD PRESSURE: 182 MMHG | OXYGEN SATURATION: 99 % | RESPIRATION RATE: 20 BRPM | HEART RATE: 69 BPM

## 2017-09-18 VITALS
SYSTOLIC BLOOD PRESSURE: 155 MMHG | DIASTOLIC BLOOD PRESSURE: 78 MMHG | RESPIRATION RATE: 18 BRPM | OXYGEN SATURATION: 96 % | TEMPERATURE: 97.9 F | HEART RATE: 66 BPM

## 2017-09-18 VITALS
OXYGEN SATURATION: 98 % | RESPIRATION RATE: 18 BRPM | DIASTOLIC BLOOD PRESSURE: 82 MMHG | SYSTOLIC BLOOD PRESSURE: 135 MMHG | HEART RATE: 62 BPM | TEMPERATURE: 97.6 F

## 2017-09-18 VITALS — SYSTOLIC BLOOD PRESSURE: 130 MMHG | DIASTOLIC BLOOD PRESSURE: 69 MMHG | HEART RATE: 65 BPM

## 2017-09-18 LAB
ANION GAP SERPL CALC-SCNC: 9 MEQ/L (ref 5–15)
BASOPHILS # BLD AUTO: 0 TH/MM3 (ref 0–0.2)
BASOPHILS NFR BLD: 0.4 % (ref 0–2)
BUN SERPL-MCNC: 5 MG/DL (ref 7–18)
CHLORIDE SERPL-SCNC: 96 MEQ/L (ref 98–107)
EOSINOPHIL # BLD: 0.1 TH/MM3 (ref 0–0.4)
EOSINOPHIL NFR BLD: 1.3 % (ref 0–4)
ERYTHROCYTE [DISTWIDTH] IN BLOOD BY AUTOMATED COUNT: 12.7 % (ref 11.6–17.2)
GFR SERPLBLD BASED ON 1.73 SQ M-ARVRAT: 113 ML/MIN (ref 89–?)
HCO3 BLD-SCNC: 26.8 MEQ/L (ref 21–32)
HCT VFR BLD CALC: 31.7 % (ref 35–46)
HEMO FLAGS: (no result)
LYMPHOCYTES # BLD AUTO: 1.3 TH/MM3 (ref 1–4.8)
LYMPHOCYTES NFR BLD AUTO: 20.7 % (ref 9–44)
MAGNESIUM SERPL-MCNC: 1.3 MG/DL (ref 1.5–2.5)
MCH RBC QN AUTO: 32.1 PG (ref 27–34)
MCHC RBC AUTO-ENTMCNC: 34.4 % (ref 32–36)
MCV RBC AUTO: 93.3 FL (ref 80–100)
MONOCYTES NFR BLD: 9.8 % (ref 0–8)
NEUTROPHILS # BLD AUTO: 4.3 TH/MM3 (ref 1.8–7.7)
NEUTROPHILS NFR BLD AUTO: 67.8 % (ref 16–70)
PLATELET # BLD: 229 TH/MM3 (ref 150–450)
POTASSIUM SERPL-SCNC: 2.8 MEQ/L (ref 3.5–5.1)
RBC # BLD AUTO: 3.39 MIL/MM3 (ref 4–5.3)
SODIUM SERPL-SCNC: 132 MEQ/L (ref 136–145)
WBC # BLD AUTO: 6.3 TH/MM3 (ref 4–11)

## 2017-09-18 RX ADMIN — MORPHINE SULFATE PRN MG: 2 INJECTION, SOLUTION INTRAMUSCULAR; INTRAVENOUS at 11:57

## 2017-09-18 RX ADMIN — Medication SCH MG: at 07:57

## 2017-09-18 RX ADMIN — MAGNESIUM SULFATE IN DEXTROSE SCH MLS/HR: 10 INJECTION, SOLUTION INTRAVENOUS at 15:47

## 2017-09-18 RX ADMIN — MORPHINE SULFATE PRN MG: 2 INJECTION, SOLUTION INTRAMUSCULAR; INTRAVENOUS at 04:58

## 2017-09-18 RX ADMIN — STANDARDIZED SENNA CONCENTRATE AND DOCUSATE SODIUM SCH TAB: 8.6; 5 TABLET, FILM COATED ORAL at 21:24

## 2017-09-18 RX ADMIN — HYDROCODONE BITARTRATE AND ACETAMINOPHEN PRN TAB: 5; 325 TABLET ORAL at 21:23

## 2017-09-18 RX ADMIN — MORPHINE SULFATE PRN MG: 2 INJECTION, SOLUTION INTRAMUSCULAR; INTRAVENOUS at 01:28

## 2017-09-18 RX ADMIN — STANDARDIZED SENNA CONCENTRATE AND DOCUSATE SODIUM SCH TAB: 8.6; 5 TABLET, FILM COATED ORAL at 07:57

## 2017-09-18 RX ADMIN — PANTOPRAZOLE SODIUM SCH MG: 40 INJECTION, POWDER, FOR SOLUTION INTRAVENOUS at 07:59

## 2017-09-18 RX ADMIN — LISINOPRIL SCH MG: 20 TABLET ORAL at 07:57

## 2017-09-18 RX ADMIN — PANTOPRAZOLE SODIUM SCH MG: 40 INJECTION, POWDER, FOR SOLUTION INTRAVENOUS at 21:24

## 2017-09-18 RX ADMIN — LISINOPRIL SCH MG: 20 TABLET ORAL at 21:24

## 2017-09-18 RX ADMIN — FOLIC ACID SCH MG: 1 TABLET ORAL at 07:57

## 2017-09-18 RX ADMIN — Medication SCH ML: at 21:00

## 2017-09-18 RX ADMIN — MORPHINE SULFATE PRN MG: 2 INJECTION, SOLUTION INTRAMUSCULAR; INTRAVENOUS at 08:01

## 2017-09-18 RX ADMIN — PHENYTOIN SODIUM SCH MLS/HR: 50 INJECTION INTRAMUSCULAR; INTRAVENOUS at 21:25

## 2017-09-18 RX ADMIN — PHENYTOIN SODIUM SCH MLS/HR: 50 INJECTION INTRAMUSCULAR; INTRAVENOUS at 04:55

## 2017-09-18 RX ADMIN — POTASSIUM CHLORIDE SCH MLS/HR: 10 INJECTION, SOLUTION INTRAVENOUS at 11:45

## 2017-09-18 RX ADMIN — POTASSIUM CHLORIDE SCH MLS/HR: 10 INJECTION, SOLUTION INTRAVENOUS at 12:47

## 2017-09-18 RX ADMIN — MULTIPLE VITAMINS W/ MINERALS TAB SCH TAB: TAB at 07:57

## 2017-09-18 RX ADMIN — MORPHINE SULFATE PRN MG: 2 INJECTION, SOLUTION INTRAMUSCULAR; INTRAVENOUS at 15:47

## 2017-09-18 RX ADMIN — Medication SCH ML: at 08:00

## 2017-09-18 RX ADMIN — MORPHINE SULFATE PRN MG: 2 INJECTION, SOLUTION INTRAMUSCULAR; INTRAVENOUS at 18:44

## 2017-09-18 RX ADMIN — POTASSIUM CHLORIDE SCH MLS/HR: 10 INJECTION, SOLUTION INTRAVENOUS at 15:47

## 2017-09-18 RX ADMIN — MAGNESIUM SULFATE IN DEXTROSE SCH MLS/HR: 10 INJECTION, SOLUTION INTRAVENOUS at 20:00

## 2017-09-18 NOTE — HHI.PR
Subjective


Remarks


Follow-up for esophagitis.  The patient is seen after EGD today.  Epigastric 

discomfort is essentially unchanged.  She wants to try to eat and go home 

afterwards if she can.  She verbalizes understanding of the need to avoid GI 

irritants.  She knows she needs to follow-up with her PCP and gastroenterology.

  She was previously taking PPI once a day.  She needs a prescription for PPI.  

She is requesting a few days prescription of pain medicine.





Objective


Vitals





Vital Signs








  Date Time  Temp Pulse Resp B/P (MAP) Pulse Ox O2 Delivery O2 Flow Rate FiO2


 


9/18/17 11:40 97.9 69 20 182/89 (120) 99   


 


9/18/17 10:49 97.8 84 18 161/89 (113) 100   


 


9/18/17 07:35 97.9 66 18 155/78 (103) 96   


 


9/18/17 05:21 98.1 72 18 181/93 (122) 97   


 


9/18/17 05:05   20     


 


9/17/17 19:41 97.8 75 18 141/77 (98) 98   


 


9/17/17 18:16 97.9 66 18 156/82 (106) 96   














I/O      


 


 9/17/17 9/17/17 9/17/17 9/18/17 9/18/17 9/18/17





 07:00 15:00 23:00 07:00 15:00 23:00


 


Intake Total 1752 ml 1000 ml  2595 ml 100 ml 


 


Output Total    1000 ml  


 


Balance 1752 ml 1000 ml  1595 ml 100 ml 


 


      


 


Intake Oral 720 ml   1500 ml  


 


IV Total 1032 ml 1000 ml  1095 ml  


 


Other     100 ml 


 


Output Urine Total    1000 ml  


 


# Voids 2  4   








Result Diagram:  


9/18/17 0658                                                                   

             9/18/17 0658





Imaging





Last Impressions








Chest X-Ray 9/16/17 1608 Signed





Impressions: 





 Service Date/Time:  Saturday, September 16, 2017 16:25 - CONCLUSION:  1. No 





 acute cardiopulmonary disease.     Viktor Garcia MD 








Objective Remarks


GENERAL: Well-developed well-nourished.  In no acute distress.


SKIN: Warm and dry. No lesions noted.


HEENT: Normocephalic. Pupils equal and round. Mucous membranes pink and moist. 


CARDIOVASCULAR: Regular rate and rhythm.  No murmur appreciated.


RESPIRATORY: No accessory muscle use. Clear to auscultation. Breath sounds 

equal bilaterally. 


GASTROINTESTINAL: Abdomen soft, non-tender, nondistended. Bowel sounds x4.


MUSCULOSKELETAL: No obvious deformities. No clubbing or cyanosis. No edema. 


NEUROLOGICAL: Awake and alert. No focal neurological deficits.  Moves upper and 

lower extremities spontaneously. Normal speech.


PSYCHIATRIC: Appropriate mood and affect; insight and judgment normal.





A/P


Problem List:  


(1) Intractable nausea and vomiting


ICD Code:  R11.2 - Nausea with vomiting, unspecified


Status:  Acute


(2) Alcohol abuse


ICD Code:  F10.10 - Alcohol abuse, uncomplicated


Status:  Chronic


(3) Hyponatremia


ICD Code:  E87.1 - Hyponatremia


Status:  Acute


(4) Hypokalemia


ICD Code:  E87.6 - Hypokalemia


Status:  Acute


(5) Leukocytosis


ICD Code:  D72.829 - Elevated white blood cell count, unspecified


Status:  Resolved


(6) HTN (hypertension)


ICD Code:  I10 - HTN (hypertension)


Status:  Chronic


(7) Tobacco abuse


ICD Code:  Z72.0 - Tobacco use


Status:  Chronic


(8) Abdominal pain


ICD Code:  R10.9 - Unspecified abdominal pain


Status:  Acute


Assessment and Plan


57-year-old female with a PMH of Anxiety, Depression, Bipolar Disorder, Alcohol 

Abuse, Hepatitis C, Tobacco Abuse and Cocaine Abuse presented with complaints 

of epigastric abdominal pain in addition to nausea/vomiting x2 days





Epigastric pain with N/V:  Patient has a history of ulcerative esophagitis 

based on pathology 4/17.  Likely exacerbated by being out of her PPI and 

drinking binge.  Hemoglobin stable.  GI consulted, performed EGD which showed 

severe grade D reflux esophagitis.  Continue PPI twice a day.  Diet as 

tolerated.  Norco and IV morphine as needed for pain. Counseled extensively 

regarding avoiding GI irritants including alcohol and tobacco.





Alcohol Abuse: Intermittent alcohol abuse for many years.  Recent relapse a few 

days ago prior to this episode. CIWA, Seizure Precautions, MVT/Thiamine/Folate 

replacement. 





Hyponatremia:  Likely secondary to chronic alcohol abuse and acute dehydration.

  Given IVF, sodium trending up.   





Hypokalemia:  Given oral potassium replacement.  Potassium remains 2.8.  Given 

30 mEq IV and 40 mEq orally.  Check magnesium and replace if needed.  Repeat 

labs as outpatient and follow up with PCP.





Leukocytosis:  WBC 12.2, likely reactive due to vomiting.  Afebrile.  CXR w/ no 

acute findings.  UA unremarkable.  WBCs trended down to normal limits.  

Resolved.





HTN:  Uncontrolled.  Increase home lisinopril.  Clonidine if needed. 





Tobacco Abuse:  Pt counselled.  NicoDerm prn if needed. 





DVT Prophylaxis:  SCD/Teds.


Discharge Planning


Discharge planning if patient is able to tolerate diet and after electrolytes 

replaced.





Problem Qualifiers





(1) Intractable nausea and vomiting:  


Qualified Codes:  R11.2 - Nausea with vomiting, unspecified


(2) HTN (hypertension):  


Qualified Codes:  I10 - Essential (primary) hypertension


(3) Abdominal pain:  


Qualified Codes:  R10.13 - Epigastric pain








Marc Goldman Sep 18, 2017 11:59

## 2017-09-19 VITALS
TEMPERATURE: 98.1 F | RESPIRATION RATE: 18 BRPM | OXYGEN SATURATION: 98 % | DIASTOLIC BLOOD PRESSURE: 90 MMHG | SYSTOLIC BLOOD PRESSURE: 167 MMHG | HEART RATE: 75 BPM

## 2017-09-19 VITALS — RESPIRATION RATE: 17 BRPM

## 2017-09-19 LAB
ANION GAP SERPL CALC-SCNC: 7 MEQ/L (ref 5–15)
BUN SERPL-MCNC: 3 MG/DL (ref 7–18)
CHLORIDE SERPL-SCNC: 97 MEQ/L (ref 98–107)
GFR SERPLBLD BASED ON 1.73 SQ M-ARVRAT: 95 ML/MIN (ref 89–?)
HCO3 BLD-SCNC: 27.2 MEQ/L (ref 21–32)
MAGNESIUM SERPL-MCNC: 1.6 MG/DL (ref 1.5–2.5)
POTASSIUM SERPL-SCNC: 3.6 MEQ/L (ref 3.5–5.1)
SODIUM SERPL-SCNC: 131 MEQ/L (ref 136–145)

## 2017-09-19 RX ADMIN — Medication SCH MG: at 07:48

## 2017-09-19 RX ADMIN — LISINOPRIL SCH MG: 20 TABLET ORAL at 07:48

## 2017-09-19 RX ADMIN — FOLIC ACID SCH MG: 1 TABLET ORAL at 07:49

## 2017-09-19 RX ADMIN — PANTOPRAZOLE SODIUM SCH MG: 40 INJECTION, POWDER, FOR SOLUTION INTRAVENOUS at 07:48

## 2017-09-19 RX ADMIN — MULTIPLE VITAMINS W/ MINERALS TAB SCH TAB: TAB at 07:48

## 2017-09-19 RX ADMIN — HYDROCODONE BITARTRATE AND ACETAMINOPHEN PRN TAB: 5; 325 TABLET ORAL at 04:36

## 2017-09-19 RX ADMIN — Medication SCH ML: at 07:49

## 2017-12-13 ENCOUNTER — HOSPITAL ENCOUNTER (EMERGENCY)
Dept: HOSPITAL 17 - NEPD | Age: 57
Discharge: HOME | End: 2017-12-13
Payer: COMMERCIAL

## 2017-12-13 VITALS
OXYGEN SATURATION: 99 % | TEMPERATURE: 98.5 F | HEART RATE: 125 BPM | SYSTOLIC BLOOD PRESSURE: 138 MMHG | RESPIRATION RATE: 20 BRPM | DIASTOLIC BLOOD PRESSURE: 81 MMHG

## 2017-12-13 VITALS
RESPIRATION RATE: 19 BRPM | OXYGEN SATURATION: 98 % | HEART RATE: 113 BPM | SYSTOLIC BLOOD PRESSURE: 146 MMHG | DIASTOLIC BLOOD PRESSURE: 102 MMHG

## 2017-12-13 VITALS — WEIGHT: 141.1 LBS | BODY MASS INDEX: 23.51 KG/M2 | HEIGHT: 65 IN

## 2017-12-13 DIAGNOSIS — F31.9: ICD-10-CM

## 2017-12-13 DIAGNOSIS — R00.0: ICD-10-CM

## 2017-12-13 DIAGNOSIS — J44.9: ICD-10-CM

## 2017-12-13 DIAGNOSIS — R10.13: ICD-10-CM

## 2017-12-13 DIAGNOSIS — D64.9: ICD-10-CM

## 2017-12-13 DIAGNOSIS — E87.1: ICD-10-CM

## 2017-12-13 DIAGNOSIS — Z87.19: ICD-10-CM

## 2017-12-13 DIAGNOSIS — K21.9: ICD-10-CM

## 2017-12-13 DIAGNOSIS — F10.239: Primary | ICD-10-CM

## 2017-12-13 LAB
ALP SERPL-CCNC: 157 U/L (ref 45–117)
ALT SERPL-CCNC: 59 U/L (ref 10–53)
ANION GAP SERPL CALC-SCNC: 13 MEQ/L (ref 5–15)
AST SERPL-CCNC: 123 U/L (ref 15–37)
BASOPHILS # BLD AUTO: 0 TH/MM3 (ref 0–0.2)
BASOPHILS NFR BLD: 0.6 % (ref 0–2)
BILIRUB SERPL-MCNC: 0.6 MG/DL (ref 0.2–1)
BUN SERPL-MCNC: 4 MG/DL (ref 7–18)
CHLORIDE SERPL-SCNC: 91 MEQ/L (ref 98–107)
EOSINOPHIL # BLD: 0 TH/MM3 (ref 0–0.4)
EOSINOPHIL NFR BLD: 0.1 % (ref 0–4)
ERYTHROCYTE [DISTWIDTH] IN BLOOD BY AUTOMATED COUNT: 13.2 % (ref 11.6–17.2)
GFR SERPLBLD BASED ON 1.73 SQ M-ARVRAT: 68 ML/MIN (ref 89–?)
HCO3 BLD-SCNC: 22.1 MEQ/L (ref 21–32)
HCT VFR BLD CALC: 36.5 % (ref 35–46)
HEMO FLAGS: (no result)
LYMPHOCYTES # BLD AUTO: 1.1 TH/MM3 (ref 1–4.8)
LYMPHOCYTES NFR BLD AUTO: 16.2 % (ref 9–44)
MCH RBC QN AUTO: 33 PG (ref 27–34)
MCHC RBC AUTO-ENTMCNC: 34.6 % (ref 32–36)
MCV RBC AUTO: 95.2 FL (ref 80–100)
MONOCYTES NFR BLD: 4.3 % (ref 0–8)
NEUTROPHILS # BLD AUTO: 5.1 TH/MM3 (ref 1.8–7.7)
NEUTROPHILS NFR BLD AUTO: 78.8 % (ref 16–70)
PLATELET # BLD: 179 TH/MM3 (ref 150–450)
POTASSIUM SERPL-SCNC: 4.1 MEQ/L (ref 3.5–5.1)
RBC # BLD AUTO: 3.84 MIL/MM3 (ref 4–5.3)
SODIUM SERPL-SCNC: 126 MEQ/L (ref 136–145)
WBC # BLD AUTO: 6.5 TH/MM3 (ref 4–11)

## 2017-12-13 PROCEDURE — 83690 ASSAY OF LIPASE: CPT

## 2017-12-13 PROCEDURE — 96375 TX/PRO/DX INJ NEW DRUG ADDON: CPT

## 2017-12-13 PROCEDURE — 96374 THER/PROPH/DIAG INJ IV PUSH: CPT

## 2017-12-13 PROCEDURE — 85025 COMPLETE CBC W/AUTO DIFF WBC: CPT

## 2017-12-13 PROCEDURE — 99284 EMERGENCY DEPT VISIT MOD MDM: CPT

## 2017-12-13 PROCEDURE — 80053 COMPREHEN METABOLIC PANEL: CPT

## 2017-12-13 PROCEDURE — 96372 THER/PROPH/DIAG INJ SC/IM: CPT

## 2017-12-13 PROCEDURE — 96361 HYDRATE IV INFUSION ADD-ON: CPT

## 2017-12-13 NOTE — PD
HPI


Chief Complaint:  Abdominal Pain


Time Seen by Provider:  17:27


Travel History


International Travel<30 days:  No


Contact w/Intl Traveler<30days:  No


Traveled to known affect area:  No





History of Present Illness


HPI


The patient was seen and examined in the presence of the nurse.  This patient 

is a local alcoholic who comes to the ER frequently with alcohol related 

problems.  She has chronic abdominal pain as well.  She was drinking alcohol 

glass bunch of days including this morning and developed some abdominal 

cramping in the epigastrium.  She complains of feeling shaky.  Severity is 

moderate.  No alleviating factors.  Duration one day.  Symptoms are exacerbated 

by her alcohol abuse.





PFSH


Past Medical History


Anemia:  Yes


Arthritis:  Yes


Asthma:  No


Autoimmune Disease:  No


Blood Disorders:  No


Bipolar Disorder:  Yes


Anxiety:  Yes


Depression:  Yes


Heart Rhythm Problems:  No


Cancer:  No


Cardiovascular Problems:  Yes


High Cholesterol:  No


Chemotherapy:  No


Chest Pain:  Yes


Congestive Heart Failure:  No


COPD:  Yes


Cerebrovascular Accident:  No


Diabetes:  No


Diminished Hearing:  No


Endocrine:  No


Gastrointestinal Disorders:  Yes (abdominal pain)


GERD:  Yes


Glaucoma:  No


Genitourinary:  No


Headaches:  Yes


Hepatitis:  Yes (HEP C)


Hiatal Hernia:  No


Hypertension:  Yes


Immune Disorder:  No


Kidney Stones:  No


Musculoskeletal:  Yes (CHRONIC BACK PAIN)


Neurologic:  No


Psychiatric:  Yes (BIPOLAR, PTSD)


Reproductive:  No


Respiratory:  Yes (COPD)


Immunizations Current:  No


Myocardial Infarction:  No


Pancreatitis:  Yes


Pneumonia:  Yes


Radiation Therapy:  No


Renal Failure:  No


Seizures:  Yes


Sickle Cell Disease:  No


Sleep Apnea:  No


Thyroid Disease:  No


Ulcer:  No


Tetanus Vaccination:  < 5 Years


Influenza Vaccination:  Yes


Menopausal:  Yes





Past Surgical History


Abdominal Surgery:  Yes


AICD:  No


Arteriovenous Shunt:  No


Cardiac Surgery:  No


Ear Surgery:  No


Endocrine Surgery:  No


Eye Surgery:  No


Genitourinary Surgery:  No


Gynecologic Surgery:  Yes (HYSTERECTOMY 2000)


Hysterectomy:  Yes


Insulin Pump:  No


Joint Replacement:  No


Neurologic Surgery:  No


Oral Surgery:  No


Pacemaker:  No


Thoracic Surgery:  No


Tonsillectomy:  Yes


Other Surgery:  Yes (BACK, HAND)





Social History


Alcohol Use:  Yes (etoh abuse )


Tobacco Use:  Yes (1 PPD)


Substance Use:  Yes ( reports use of coacaine in 2010; MARIJUANA CURRENTLY)





Allergies-Medications


(Allergen,Severity, Reaction):  


Coded Allergies:  


     clindamycin (Unverified  Allergy, Severe, UNKNOWN REACTION, 12/13/17)


     hydroxyzine (Unverified  Allergy, Severe, "HEAD SPINS", 12/13/17)


     levofloxacin (Unverified  Allergy, Severe, UNKNOWN REACTION, 12/13/17)


     penicillin G (Unverified  Allergy, Severe, BLISTERS, 12/13/17)


     codeine (Unverified  Adverse Reaction, Severe, "EYE BLIND", 12/13/17)


 DIPLOPIA AND DIZZINESS


Reported Meds & Prescriptions





Reported Meds & Active Scripts


Active


Zofran (Ondansetron HCl) 4 Mg Tab 4 Mg PO Q6HR PRN


Chlordiazepoxide HCl 25 Mg Capsule 25 Mg PO Q8HR PRN


Lisinopril 20 Mg Tab 20 Mg PO BID


Reported


Flexeril (Cyclobenzaprine HCl) 10 Mg Tab 10 Mg PO TID


Hydroxyzine HCl 25 Mg Tab 25 Mg PO HS PRN


Omeprazole 40 Mg Cap 40 Mg PO DAILY








Review of Systems


General / Constitutional:  No: Fever


Eyes:  No: Visual changes


HENT:  No: Headaches


Cardiovascular:  Positive: Tachycardia, No: Chest Pain or Discomfort


Respiratory:  No: Shortness of Breath


Gastrointestinal:  Positive: Nausea, Abdominal Pain


Genitourinary:  No: Dysuria


Musculoskeletal:  No: Pain


Skin:  No Rash


Neurologic:  Positive: Tremor, No: Weakness


Psychiatric:  Positive: Substance Abuse, No: Depression


Endocrine:  No: Polydipsia


Hematologic/Lymphatic:  No: Easy Bruising





Physical Exam


Narrative


GENERAL: Disheveled  well-developed patient with abdominal cramps and tremor 


Skin: Focused skin assessment reveals no rash and nodules. Skin is Warm and dry.


HEAD: Atraumatic. Normocephalic. 


EYES: Pupils equal and round. No scleral icterus. No injection or drainage. 


ENT: No nasal bleeding or discharge.  Mucous membranes pink and moist.


NECK: Trachea midline. No JVD. 


CARDIOVASCULAR: Regular rate and rhythm.  No murmur appreciated.


RESPIRATORY: No accessory muscle use. Clear to auscultation. Breath sounds 

equal bilaterally. 


GASTROINTESTINAL: Abdomen soft, non-tender, nondistended. Hepatic and splenic 

margins not palpable. 


MUSCULOSKELETAL: No obvious deformities. No clubbing.  No cyanosis.  No edema.  

Slightly tremulous upper extremities


NEUROLOGICAL: Awake and alert. No obvious cranial nerve deficits.  Motor 

grossly within normal limits. Normal speech.


PSYCHIATRIC: Appropriate mood and affect; insight and judgment poor.





Data


Data


Last Documented VS





Vital Signs








  Date Time  Temp Pulse Resp B/P (MAP) Pulse Ox O2 Delivery O2 Flow Rate FiO2


 


12/13/17 18:30  113 19 146/102 (117) 98 Room Air  


 


12/13/17 17:20 98.5       








Orders





 Orders


Complete Blood Count With Diff (12/13/17 17:46)


Comprehensive Metabolic Panel (12/13/17 17:46)


Lipase (12/13/17 17:46)


Iv Access Insert/Monitor (12/13/17 17:46)


NPO (12/13/17 17:46)


Ondansetron Inj (Zofran Inj) (12/13/17 18:00)


Sodium Chloride 0.9% Flush (Ns Flush) (12/13/17 18:00)


Sodium Chlor 0.9% 1000 Ml Inj (Ns 1000 M (12/13/17 18:00)


Midazolam Inj (Versed Inj) (12/13/17 18:00)


Promethazine Inj (Phenergan Inj) (12/13/17 20:00)


Chlordiazepoxide (Librium) (12/13/17 20:00)





Labs





Laboratory Tests








Test


  12/13/17


18:10


 


White Blood Count 6.5 TH/MM3 


 


Red Blood Count 3.84 MIL/MM3 


 


Hemoglobin 12.7 GM/DL 


 


Hematocrit 36.5 % 


 


Mean Corpuscular Volume 95.2 FL 


 


Mean Corpuscular Hemoglobin 33.0 PG 


 


Mean Corpuscular Hemoglobin


Concent 34.6 % 


 


 


Red Cell Distribution Width 13.2 % 


 


Platelet Count 179 TH/MM3 


 


Mean Platelet Volume 8.8 FL 


 


Neutrophils (%) (Auto) 78.8 % 


 


Lymphocytes (%) (Auto) 16.2 % 


 


Monocytes (%) (Auto) 4.3 % 


 


Eosinophils (%) (Auto) 0.1 % 


 


Basophils (%) (Auto) 0.6 % 


 


Neutrophils # (Auto) 5.1 TH/MM3 


 


Lymphocytes # (Auto) 1.1 TH/MM3 


 


Monocytes # (Auto) 0.3 TH/MM3 


 


Eosinophils # (Auto) 0.0 TH/MM3 


 


Basophils # (Auto) 0.0 TH/MM3 


 


CBC Comment DIFF FINAL 


 


Differential Comment  


 


Blood Urea Nitrogen 4 MG/DL 


 


Creatinine 0.86 MG/DL 


 


Random Glucose 114 MG/DL 


 


Total Protein 9.4 GM/DL 


 


Albumin 4.0 GM/DL 


 


Calcium Level 8.5 MG/DL 


 


Alkaline Phosphatase 157 U/L 


 


Aspartate Amino Transf


(AST/SGOT) 123 U/L 


 


 


Alanine Aminotransferase


(ALT/SGPT) 59 U/L 


 


 


Total Bilirubin 0.6 MG/DL 


 


Sodium Level 126 MEQ/L 


 


Potassium Level 4.1 MEQ/L 


 


Chloride Level 91 MEQ/L 


 


Carbon Dioxide Level 22.1 MEQ/L 


 


Anion Gap 13 MEQ/L 


 


Estimat Glomerular Filtration


Rate 68 ML/MIN 


 


 


Lipase 125 U/L 











MDM


Medical Decision Making


Medical Screen Exam Complete:  Yes


Emergency Medical Condition:  Yes


Medical Record Reviewed:  Yes


Differential Diagnosis


Alcohol intoxication, alcohol withdrawal, pancreatitis, chronic abdominal pain


Narrative Course


I have reviewed the patient's electronic medical record.  She is a frequent 

visitor for abdominal pain and alcohol problems





IV placed


CBC is normal


metabolic profile shows some hyponatremia and mild LFT elevation as expected





lipase is normal


I gave her IV Zofran and 1 L normal saline IV bolus.  The hospital is out of 

stock of IV Ativan so have given her 2 mg IV Versed





Patient is improved on recheck


She is ambulatory


I gave her an additional Phenergan and a dose of Librium


She had alcohol withdrawal symptoms they are fairly mild and can be managed as 

an outpatient at this time


I wrote a prescription for some as needed Librium as well as some Zofran


Patient's pain from his alcoholism which is going to be impossible to fix in 

the emergency department.  I did recommend [facility] alcohol 

rehabilitation services as well as primary care follow-up





Diagnosis





 Primary Impression:  


 Alcohol withdrawal


 Qualified Codes:  F10.230 - Alcohol dependence with withdrawal, uncomplicated


 Additional Impressions:  


 Hyponatremia


 Abdominal pain, epigastric





***Additional Instructions:  


The patient was advised to follow up with their physician and return if they 

worsen.


The patient was warned about potential sedation for the medications they will 

receive on prescription.


Recommend [facility] alcohol rehabilitation services


***Med/Other Pt SpecificInfo:  Prescription(s) given


Scripts


Ondansetron (Zofran) 4 Mg Tab


4 MG PO Q6HR Y for NAUSEA OR VOMITING, #12 TAB 0 Refills


   Prov: Luis Antonio Hoover MD         12/13/17 


Chlordiazepoxide HCl (Chlordiazepoxide HCl) 25 Mg Capsule


25 MG PO Q8HR Y for WITHDRAWAL, #10


   Prov: Luis Antonio Hoover MD         12/13/17


Disposition:  01 DISCHARGE HOME


Condition:  Stable











Luis Antonio Hoover MD Dec 13, 2017 17:52

## 2018-01-09 ENCOUNTER — HOSPITAL ENCOUNTER (EMERGENCY)
Dept: HOSPITAL 17 - NEPD | Age: 58
Discharge: HOME | End: 2018-01-09
Payer: COMMERCIAL

## 2018-01-09 VITALS — BODY MASS INDEX: 27.89 KG/M2 | WEIGHT: 147.71 LBS | HEIGHT: 61 IN

## 2018-01-09 VITALS
RESPIRATION RATE: 18 BRPM | HEART RATE: 98 BPM | SYSTOLIC BLOOD PRESSURE: 158 MMHG | TEMPERATURE: 98.8 F | DIASTOLIC BLOOD PRESSURE: 98 MMHG | OXYGEN SATURATION: 98 %

## 2018-01-09 DIAGNOSIS — F10.129: Primary | ICD-10-CM

## 2018-01-09 DIAGNOSIS — F31.9: ICD-10-CM

## 2018-01-09 DIAGNOSIS — F17.210: ICD-10-CM

## 2018-01-09 DIAGNOSIS — M19.90: ICD-10-CM

## 2018-01-09 DIAGNOSIS — J44.9: ICD-10-CM

## 2018-01-09 DIAGNOSIS — B19.20: ICD-10-CM

## 2018-01-09 DIAGNOSIS — I10: ICD-10-CM

## 2018-01-09 DIAGNOSIS — F43.10: ICD-10-CM

## 2018-01-09 PROCEDURE — 99283 EMERGENCY DEPT VISIT LOW MDM: CPT

## 2018-01-09 NOTE — PD
HPI


.


Alcohol intoxication


Chief Complaint:  Alcohol/Drug Intoxication


Time Seen by Provider:  15:04


Travel History


International Travel<30 days:  No


Contact w/Intl Traveler<30days:  No


Traveled to known affect area:  No





History of Present Illness


HPI


This patient presents with chief complaint of alcohol intoxication.  She was 

brought to us by [facility] and accompanied here by the police.  She was not however 

made a Leti's Act.  The police told her that they would make her a Baker Act if 

she did not come to the hospital voluntarily.  She is requesting medication to 

prevent withdrawal, specifically Librium.  She is also requesting pain 

medication for her chronic abdominal pain.





No further history is obtainable from this patient because of her intoxication.





PFSH


Past Medical History


Anemia:  Yes


Arthritis:  Yes


Asthma:  No


Autoimmune Disease:  No


Blood Disorders:  No


Bipolar Disorder:  Yes


Anxiety:  Yes


Depression:  Yes


Heart Rhythm Problems:  No


Cancer:  No


Cardiovascular Problems:  Yes


High Cholesterol:  No


Chemotherapy:  No


Chest Pain:  Yes


Congestive Heart Failure:  No


COPD:  Yes


Cerebrovascular Accident:  No


Diabetes:  No


Diminished Hearing:  No


Endocrine:  No


Gastrointestinal Disorders:  Yes (abdominal pain)


GERD:  Yes


Glaucoma:  No


Genitourinary:  No


Headaches:  Yes


Hepatitis:  Yes (HEP C)


Hiatal Hernia:  No


Hypertension:  Yes


Immune Disorder:  No


Kidney Stones:  No


Musculoskeletal:  Yes (CHRONIC BACK PAIN)


Neurologic:  No


Psychiatric:  Yes (BIPOLAR, PTSD)


Reproductive:  No


Respiratory:  Yes (COPD)


Immunizations Current:  No


Myocardial Infarction:  No


Pancreatitis:  Yes


Pneumonia:  Yes


Radiation Therapy:  No


Renal Failure:  No


Seizures:  Yes


Sickle Cell Disease:  No


Sleep Apnea:  No


Thyroid Disease:  No


Ulcer:  No


Menopausal:  Yes





Past Surgical History


Abdominal Surgery:  Yes


AICD:  No


Arteriovenous Shunt:  No


Cardiac Surgery:  No


Ear Surgery:  No


Endocrine Surgery:  No


Eye Surgery:  No


Genitourinary Surgery:  No


Gynecologic Surgery:  Yes (HYSTERECTOMY 2000)


Hysterectomy:  Yes


Insulin Pump:  No


Joint Replacement:  No


Neurologic Surgery:  No


Oral Surgery:  No


Pacemaker:  No


Thoracic Surgery:  No


Tonsillectomy:  Yes


Other Surgery:  Yes (BACK, HAND)





Social History


Alcohol Use:  Yes (etoh abuse )


Tobacco Use:  Yes (1 PPD)


Substance Use:  Yes ( reports use of coacaine 2 MONTHS AGO; MARIJUANA CURRENTLY)





Allergies-Medications


(Allergen,Severity, Reaction):  


Coded Allergies:  


     clindamycin (Unverified  Allergy, Severe, UNKNOWN REACTION, 12/13/17)


     hydroxyzine (Unverified  Allergy, Severe, "HEAD SPINS", 12/13/17)


     levofloxacin (Unverified  Allergy, Severe, UNKNOWN REACTION, 12/13/17)


     penicillin G (Unverified  Allergy, Severe, BLISTERS, 12/13/17)


     codeine (Unverified  Adverse Reaction, Severe, "EYE BLIND", 12/13/17)


 DIPLOPIA AND DIZZINESS


Reported Meds & Prescriptions





Reported Meds & Active Scripts


Active


Zofran (Ondansetron HCl) 4 Mg Tab 4 Mg PO Q6HR PRN


Chlordiazepoxide HCl 25 Mg Capsule 25 Mg PO Q8HR PRN


Lisinopril 20 Mg Tab 20 Mg PO BID


Reported


Flexeril (Cyclobenzaprine HCl) 10 Mg Tab 10 Mg PO TID


Hydroxyzine HCl 25 Mg Tab 25 Mg PO HS PRN


Omeprazole 40 Mg Cap 40 Mg PO DAILY








Review of Systems


ROS Limitations:  Intoxication





Physical Exam


Narrative


GENERAL: Disheveled.  Slurred speech.  Wobbly gait.


SKIN: Warm and dry.


HEAD: Normocephalic/atraumatic.


EYES: Pupils are equal.  Extraocular movements are intact.


NECK: Normal range of motion.


CARDIOVASCULAR: Regular rate and rhythm.


RESPIRATORY: Nonlabored respirations.


MUSCULOSKELETAL: Atraumatic.


NEUROLOGICAL: Nonfocal.


PSYCHIATRIC: Intoxicated.





Data


Data


Last Documented VS





Vital Signs








  Date Time  Temp Pulse Resp B/P (MAP) Pulse Ox O2 Delivery O2 Flow Rate FiO2


 


1/9/18 14:51      Room Air  


 


1/9/18 14:45 98.8 98 18 158/98 (118) 98   








Orders





 Orders


Ed Discharge Order (1/9/18 16:01)








[facility]


Medical Decision Making


Medical Screen Exam Complete:  Yes


Emergency Medical Condition:  Yes


Differential Diagnosis


Differential diagnosis includes but is not limited to alcohol intoxication, 

drug intoxication


Narrative Course


This patient is brought to us by [facility] basically for safe keeping.  She was 

apparently found intoxicated.  The police told her that she could come to the 

hospital voluntarily for that they would make her a Baker Act.  She chose to 

come voluntarily.  She is now asking to go home.  The nurse is effectively 

stalling the patient by giving her food, etc.





This patient has been up ambulatory in the department.  She has requested 

discharged on several occasions.  I don't think we really have any reason to 

hold her here.  She seems steady on her feet.





Diagnosis





 Primary Impression:  


 Alcoholic


Disposition:  01 DISCHARGE HOME


Condition:  Stable











Elizabeth Worrell MD Jan 9, 2018 15:30

## 2018-05-08 ENCOUNTER — HOSPITAL ENCOUNTER (EMERGENCY)
Dept: HOSPITAL 17 - NEPC | Age: 58
Discharge: HOME | End: 2018-05-08
Payer: MEDICAID

## 2018-05-08 DIAGNOSIS — B19.20: ICD-10-CM

## 2018-05-08 DIAGNOSIS — K21.9: ICD-10-CM

## 2018-05-08 DIAGNOSIS — F17.200: ICD-10-CM

## 2018-05-08 DIAGNOSIS — I10: ICD-10-CM

## 2018-05-08 DIAGNOSIS — F12.90: ICD-10-CM

## 2018-05-08 DIAGNOSIS — K44.9: Primary | ICD-10-CM

## 2018-05-08 LAB
ALBUMIN: 3.5 GM/DL (ref 3.4–5)
ALKALINE PHOSPHATASE: 187 U/L (ref 45–117)
ALT (GPT): 61 U/L (ref 10–53)
ANION GAP: 12 MEQ/L (ref 5–15)
APTT (PATIENT): 26.8 SEC (ref 24.3–30.1)
AST (GOT): 127 U/L (ref 15–37)
AUTOMATED NEUTROPHIL #: 10.2 TH/MM3 (ref 1.8–7.7)
BASOPHIL #: 0 TH/MM3 (ref 0–0.2)
BASOPHIL %: 0.1 % (ref 0–2)
BICARBONATE: 27.2 MEQ/L (ref 21–32)
BILIRUB SERPL-MCNC: 0.7 MG/DL (ref 0.2–1)
BLOOD UREA NITROGEN: 10 MG/DL (ref 7–18)
CALCIUM: 8.9 MG/DL (ref 8.5–10.1)
CHLORIDE: 88 MEQ/L (ref 98–107)
CREATININE: 1.03 MG/DL (ref 0.5–1)
EOSINOPHIL #: 0 TH/MM3 (ref 0–0.4)
EOSINOPHIL %: 0 % (ref 0–4)
GLOMERULAR FILTRATION RATE: 55 ML/MIN (ref 89–?)
GLUCOSE,RANDOM: 153 MG/DL (ref 74–106)
HEMATOCRIT: 36.9 % (ref 35–46)
HEMO FLAGS: (no result)
HEMOGLOBIN: 12.8 GM/DL (ref 11.6–15.3)
INTERNATIONAL NORMALIZED RATIO: 1.3 RATIO
LIPASE: 88 U/L (ref 73–393)
LYMPH %: 8.3 % (ref 9–44)
LYMPHOCYTE #: 1 TH/MM3 (ref 1–4.8)
MEAN CELL VOLUME: 95 FL (ref 80–100)
MEAN CORPUSCULAR HEMOGLOBIN: 33 PG (ref 27–34)
MEAN CORPUSCULAR HGB CONC: 34.7 % (ref 32–36)
MEAN PLATELET VOLUME: 9.2 FL (ref 7–11)
MONO %: 5.3 % (ref 0–8)
MONOCYTE #: 0.6 TH/MM3 (ref 0–0.9)
NEUT %: 86.3 % (ref 16–70)
PLATELET COUNT: 254 TH/MM3 (ref 150–450)
POTASSIUM: 3.7 MEQ/L (ref 3.5–5.1)
PROTHROMBIN TIME - PATIENT: 13.5 SEC (ref 9.8–11.6)
RED BLOOD COUNT: 3.88 MIL/MM3 (ref 4–5.3)
RED CELL DISTRIBUTION WIDTH: 14.9 % (ref 11.6–17.2)
SODIUM (NA): 127 MEQ/L (ref 136–145)
TOTAL PROTEIN: 9.4 GM/DL (ref 6.4–8.2)
WHITE BLOOD COUNT: 11.9 TH/MM3 (ref 4–11)

## 2018-05-08 PROCEDURE — 85730 THROMBOPLASTIN TIME PARTIAL: CPT

## 2018-05-08 PROCEDURE — 83690 ASSAY OF LIPASE: CPT

## 2018-05-08 PROCEDURE — 96374 THER/PROPH/DIAG INJ IV PUSH: CPT

## 2018-05-08 PROCEDURE — 96361 HYDRATE IV INFUSION ADD-ON: CPT

## 2018-05-08 PROCEDURE — 93005 ELECTROCARDIOGRAM TRACING: CPT

## 2018-05-08 PROCEDURE — 99284 EMERGENCY DEPT VISIT MOD MDM: CPT

## 2018-05-08 PROCEDURE — 85610 PROTHROMBIN TIME: CPT

## 2018-05-08 PROCEDURE — 96375 TX/PRO/DX INJ NEW DRUG ADDON: CPT

## 2018-05-08 PROCEDURE — 80053 COMPREHEN METABOLIC PANEL: CPT

## 2018-05-08 PROCEDURE — 85025 COMPLETE CBC W/AUTO DIFF WBC: CPT

## 2018-05-08 PROCEDURE — 74176 CT ABD & PELVIS W/O CONTRAST: CPT

## 2018-05-08 RX ADMIN — PHENYTOIN SODIUM 1 MLS/HR: 50 INJECTION INTRAMUSCULAR; INTRAVENOUS at 13:24

## 2018-05-08 RX ADMIN — MORPHINE SULFATE 1 MG: 2 INJECTION, SOLUTION INTRAMUSCULAR; INTRAVENOUS at 11:36

## 2018-05-08 RX ADMIN — ONDANSETRON 1 MG: 2 INJECTION, SOLUTION INTRAMUSCULAR; INTRAVENOUS at 11:35

## 2018-05-08 RX ADMIN — PANTOPRAZOLE SODIUM 1 MG: 40 INJECTION, POWDER, FOR SOLUTION INTRAVENOUS at 13:24

## 2018-05-08 RX ADMIN — ONDANSETRON 1 MG: 2 INJECTION, SOLUTION INTRAMUSCULAR; INTRAVENOUS at 13:25

## 2018-05-08 RX ADMIN — PHENYTOIN SODIUM 1 MLS/HR: 50 INJECTION INTRAMUSCULAR; INTRAVENOUS at 11:35

## 2018-05-08 RX ADMIN — MORPHINE SULFATE 1 MG: 2 INJECTION, SOLUTION INTRAMUSCULAR; INTRAVENOUS at 13:24

## 2023-05-27 NOTE — PD.PROCEDR
GI Procedure





REFERRING PHYSICIAN


PREETI





PROCEDURE PERFORMED


EGD with biopsy





INDICATION FOR PROCEDURE


Epigastric pain





PROCEDURE:


The procedure, risks and benefits were discussed with Ms. Gill and 

informed


consent was obtained.  Anesthesia sedated her with Diprivan.  She was placed in 

the left lateral decubitus position.





EGD:


The Pentax videoscope was introduced through the oropharynx and advanced to the 

second portion of the duodenum under direct visualization. Retroflexion was 

performed in the stomach.





FINDINGS:


Esophagus there was severe inflammation with ulceration in the distal esophagus 

this would be a grade D reflux esophagitis biopsies were taken


Stomach there was a moderate size hiatal hernia otherwise the stomach was 

unremarkable


Duodenum this was normal





ESTIMATED BLOOD LOSS:


None





SPECIMENS REMOVED:


Esophageal biopsy





COMPLICATIONS:


None





IMPRESSION:


Severe grade D reflux esophagitis


Hiatal hernia





PLAN:


Await biopsy


Continue Protonix 40 mg twice daily


Reflux precautions


EGD in 2 months


Advance diet as tolerated











Miguel Nazario MD Sep 18, 2017 10:57
frequent